# Patient Record
Sex: MALE | Race: WHITE | Employment: OTHER | URBAN - METROPOLITAN AREA
[De-identification: names, ages, dates, MRNs, and addresses within clinical notes are randomized per-mention and may not be internally consistent; named-entity substitution may affect disease eponyms.]

---

## 2017-06-02 ENCOUNTER — OFFICE VISIT (OUTPATIENT)
Dept: INTERNAL MEDICINE CLINIC | Age: 72
End: 2017-06-02

## 2017-06-02 VITALS
TEMPERATURE: 97.6 F | OXYGEN SATURATION: 98 % | SYSTOLIC BLOOD PRESSURE: 124 MMHG | DIASTOLIC BLOOD PRESSURE: 84 MMHG | RESPIRATION RATE: 18 BRPM | HEART RATE: 67 BPM | HEIGHT: 72 IN | WEIGHT: 231 LBS | BODY MASS INDEX: 31.29 KG/M2

## 2017-06-02 DIAGNOSIS — I10 BENIGN ESSENTIAL HTN: Primary | ICD-10-CM

## 2017-06-02 DIAGNOSIS — H61.22 HEARING LOSS DUE TO CERUMEN IMPACTION, LEFT: ICD-10-CM

## 2017-06-02 NOTE — PROGRESS NOTES
HISTORY OF PRESENT ILLNESS    Chief Complaint   Patient presents with    Blood Pressure Check     6 mo f/u       Presents for follow-up    He is doing fairly well. Went to the beach last week    Reports left ear pressure since 4/8 while on a cruise. Scheduled colonoscopy for 7/17/17    Hypertension  Hypertension ROS: taking medications as instructed, no medication side effects noted, no TIA's, no chest pain on exertion, no dyspnea on exertion, no swelling of ankles     reports that he has never smoked. He has never used smokeless tobacco.    reports that he drinks about 2.5 oz of alcohol per week    BP Readings from Last 2 Encounters:   06/02/17 124/84   12/09/16 136/85       Review of Systems   All other systems reviewed and are negative, except as noted in HPI    Past Medical and Surgical History   has a past medical history of Benign essential HTN; Elevated hemidiaphragm; History of rubella; Prostate cancer ([facility] [address] (5/2002); Right hip pain; Rosacea; and Umbilical hernia (7/00/8947). has a past surgical history that includes prostatectomy (5/2002); colonoscopy (02/25/2005); and hernia repair (05/2012). reports that he has never smoked. He has never used smokeless tobacco. He reports that he drinks about 2.5 oz of alcohol per week   family history includes Cancer in his son; Cancer (age of onset: 79) in his father; Cancer (age of onset: 76) in his mother; Heart Disease in his brother; Hypertension in his brother. Physical Exam   Nursing note and vitals reviewed. Blood pressure 124/84, pulse 67, temperature 97.6 °F (36.4 °C), temperature source Oral, resp. rate 18, height 6' (1.829 m), weight 231 lb (104.8 kg), SpO2 98 %. Constitutional:  No distress. Eyes: Conjunctivae are normal.   Ears:  Hearing grossly decreased on left. Cerumen impaction on left  Cardiovascular: Normal rate.   regular rhythm, no murmurs or gallops  No edema  Pulmonary/Chest: Effort normal.   CTAB  Musculoskeletal: moves all 4 extremities   Neurological: Alert and oriented to person, place, and time. Skin: No rash noted. Psychiatric: Normal mood and affect. Behavior is normal.     ASSESSMENT and PLAN  Claven Mcburney was seen today for blood pressure check. Diagnoses and all orders for this visit:    Benign essential HTN- Controlled on current regimen. Continue current medications as written in chart    Hearing loss due to cerumen impaction, left  Ceruminosis is noted. Wax is removed by manual debridement. Instructions for home care to prevent wax buildup are given. lab results and schedule of future lab studies reviewed with patient  reviewed medications and side effects in detail    Return to clinic for further evaluation if new symptoms develop    Follow-up Disposition: Not on File    Current Outpatient Prescriptions   Medication Sig    lisinopril (PRINIVIL, ZESTRIL) 20 mg tablet TAKE ONE TABLET BY MOUTH EVERY DAY    doxycycline (VIBRAMYCIN) 50 mg capsule TAKE ONE CAPSULE BY MOUTH EVERY DAY.  ibuprofen (ADVIL) 200 mg tablet Take  by mouth. 2 tablets BID    ACETAMINOPHEN/DIPHENHYDRAMINE (EXCEDRIN P.M. PO) Take  by mouth. 1 tablet BID    sildenafil citrate (VIAGRA) 100 mg tablet Take 1 Tab by mouth daily as needed.  multivitamins-minerals-lutein (CENTRUM SILVER) Tab Take 1 Tab by mouth daily. No current facility-administered medications for this visit.

## 2017-06-02 NOTE — MR AVS SNAPSHOT
Visit Information Date & Time Provider Department Dept. Phone Encounter #  
 6/2/2017  8:30 AM Jerilyn Mann MD [facility] [address] [facility] [id number] Upcoming Health Maintenance Date Due COLONOSCOPY 12/9/2017* INFLUENZA AGE 9 TO ADULT 8/1/2017 MEDICARE YEARLY EXAM 12/10/2017 GLAUCOMA SCREENING Q2Y 12/16/2018 Pneumococcal 65+ High/Highest Risk (2 of 2 - PPSV23) 1/1/2020 DTaP/Tdap/Td series (2 - Td) 12/9/2026 *Topic was postponed. The date shown is not the original due date. Allergies as of 6/2/2017  Review Complete On: 6/2/2017 By: Jerilyn Mann MD  
 No Known Allergies Current Immunizations  Reviewed on 12/9/2016 Name Date Influenza High Dose Vaccine PF 12/9/2016 Influenza Vaccine 10/1/2015 Influenza Vaccine Split 10/12/2012, 10/7/2011 Pneumococcal Vaccine (Unspecified Type) 1/1/2015 TB Skin Test (PPD) 1/1/2008 Not reviewed this visit You Were Diagnosed With   
  
 Codes Comments Benign essential HTN    -  Primary ICD-10-CM: I10 
ICD-9-CM: 401.1 Hearing loss due to cerumen impaction, left     ICD-10-CM: H61.22 
ICD-9-CM: 389.8, 380.4 Vitals BP Pulse Temp Resp Height(growth percentile) Weight(growth percentile) 124/84 67 97.6 °F (36.4 °C) (Oral) 18 6' (1.829 m) 231 lb (104.8 kg) SpO2 BMI Smoking Status 98% 31.33 kg/m2 Never Smoker Vitals History BMI and BSA Data Body Mass Index Body Surface Area  
 31.33 kg/m 2 2.31 m 2 Preferred Pharmacy Pharmacy Name Phone [phone] Your Updated Medication List  
  
   
This list is accurate as of: 6/2/17  8:51 AM.  Always use your most recent med list. ADVIL 200 mg tablet Generic drug:  ibuprofen Take  by mouth. 2 tablets BID CENTRUM SILVER Tab tablet Generic drug:  multivitamins-minerals-lutein Take 1 Tab by mouth daily. doxycycline 50 mg capsule Commonly known as:  VIBRAMYCIN  
TAKE ONE CAPSULE BY MOUTH EVERY DAY. EXCEDRIN P.M. PO Take  by mouth. 1 tablet BID  
  
 lisinopril 20 mg tablet Commonly known as:  PRINIVIL, ZESTRIL  
TAKE ONE TABLET BY MOUTH EVERY DAY  
  
 sildenafil citrate 100 mg tablet Commonly known as:  VIAGRA Take 1 Tab by mouth daily as needed. Introducing [facility] HE[facility] SERVICES! Dear Kaylee Parson: Thank you for requesting a [employer] account. Our records indicate that you already have an active [employer] App account. You can access your account anytime at [email or web address] Did you know that you can access your hospital and ER discharge instructions at any time in [employer] App? You can also review all of your test results from your hospital stay or ER visit. Additional Information If you have questions, please visit the Frequently Asked Questions section of the [employer] website at [email or web address] Remember, [employer] App is NOT to be used for urgent needs. For medical emergencies, dial 911. Now available from your iPhone and Android! Please provide this summary of care documentation to your next provider. Your primary care clinician is listed as Liz Hunter. If you have any questions after today's visit, please call [phone].

## 2017-12-04 ENCOUNTER — HOSPITAL ENCOUNTER (OUTPATIENT)
Dept: LAB | Age: 72
Discharge: HOME | End: 2017-12-04
Payer: MEDICARE

## 2017-12-04 ENCOUNTER — OFFICE VISIT (OUTPATIENT)
Dept: INTERNAL MEDICINE CLINIC | Age: 72
End: 2017-12-04

## 2017-12-04 VITALS
DIASTOLIC BLOOD PRESSURE: 69 MMHG | BODY MASS INDEX: 31.56 KG/M2 | SYSTOLIC BLOOD PRESSURE: 138 MMHG | HEART RATE: 76 BPM | HEIGHT: 72 IN | RESPIRATION RATE: 12 BRPM | WEIGHT: 233 LBS | TEMPERATURE: 97.7 F

## 2017-12-04 DIAGNOSIS — Z00.00 MEDICARE ANNUAL WELLNESS VISIT, SUBSEQUENT: Primary | ICD-10-CM

## 2017-12-04 DIAGNOSIS — Z12.5 SCREENING PSA (PROSTATE SPECIFIC ANTIGEN): ICD-10-CM

## 2017-12-04 DIAGNOSIS — Z23 ENCOUNTER FOR IMMUNIZATION: ICD-10-CM

## 2017-12-04 DIAGNOSIS — I10 BENIGN ESSENTIAL HTN: ICD-10-CM

## 2017-12-04 DIAGNOSIS — L71.9 ROSACEA: ICD-10-CM

## 2017-12-04 DIAGNOSIS — C61 PROSTATE CANCER (HCC): ICD-10-CM

## 2017-12-04 PROCEDURE — 80061 LIPID PANEL: CPT

## 2017-12-04 PROCEDURE — 84153 ASSAY OF PSA TOTAL: CPT

## 2017-12-04 PROCEDURE — 80048 BASIC METABOLIC PNL TOTAL CA: CPT

## 2017-12-04 PROCEDURE — 36415 COLL VENOUS BLD VENIPUNCTURE: CPT

## 2017-12-04 RX ORDER — LISINOPRIL 20 MG/1
TABLET ORAL
Qty: 90 TAB | Refills: 4 | Status: SHIPPED | OUTPATIENT
Start: 2017-12-04 | End: 2019-02-16 | Stop reason: SDUPTHER

## 2017-12-04 RX ORDER — DOXYCYCLINE HYCLATE 50 MG/1
CAPSULE ORAL
Qty: 90 CAP | Refills: 4 | Status: SHIPPED | OUTPATIENT
Start: 2017-12-04 | End: 2018-06-05 | Stop reason: SDUPTHER

## 2017-12-04 NOTE — PROGRESS NOTES
HISTORY OF PRESENT ILLNESS    Chief Complaint   Patient presents with    Hypertension       Presents for follow-up    Getting re- 1/20/18  Having R THR 2/12/18 w Dr. Carol Thomas  Colonoscopy 7/19/17 tubular adenoma- repeat 5 years  Using doxycyline for rosacea    Hypertension  Hypertension ROS: taking medications as instructed, no medication side effects noted, no TIA's, no chest pain on exertion, no dyspnea on exertion, no swelling of ankles     reports that he has never smoked. He has never used smokeless tobacco.    reports that he drinks about 2.5 oz of alcohol per week    BP Readings from Last 2 Encounters:   12/04/17 138/69   06/02/17 124/84       Review of Systems   All other systems reviewed and are negative, except as noted in HPI    Past Medical and Surgical History   has a past medical history of Benign essential HTN; Elevated hemidiaphragm; History of rubella; Prostate cancer ([facility] [address] (5/2002); Right hip pain; Rosacea; and Umbilical hernia (8/47/3176). has a past surgical history that includes prostatectomy (5/2002); colonoscopy (02/25/2005); hernia repair (05/2012); and colonoscopy (7/17/17). reports that he has never smoked. He has never used smokeless tobacco. He reports that he drinks about 2.5 oz of alcohol per week   family history includes Cancer in his son; Cancer (age of onset: 79) in his father; Cancer (age of onset: 76) in his mother; Heart Disease in his brother; Hypertension in his brother. Physical Exam   Nursing note and vitals reviewed. Blood pressure 138/69, pulse 76, temperature 97.7 °F (36.5 °C), temperature source Oral, resp. rate 12, height 6' (1.829 m), weight 233 lb (105.7 kg). Constitutional:  No distress. Eyes: Conjunctivae are normal.   Ears:  Hearing grossly intact  Cardiovascular: Normal rate.   regular rhythm, no murmurs or gallops  No edema  Pulmonary/Chest: Effort normal.   CTAB  Musculoskeletal: moves all 4 extremities   Neurological: Alert and oriented to person, place, and time. Skin: No rash noted. Psychiatric: Normal mood and affect. Behavior is normal.     ASSESSMENT and PLAN  Diagnoses and all orders for this visit:    1. Medicare annual wellness visit, subsequent    2. Benign essential HTN- Controlled on current regimen. Continue current medications as written in chart  -     lisinopril (PRINIVIL, ZESTRIL) 20 mg tablet; TAKE ONE TABLET BY MOUTH EVERY DAY  -     LIPID PANEL  -     METABOLIC PANEL, BASIC    3. Rosacea  -     doxycycline (VIBRAMYCIN) 50 mg capsule; TAKE ONE CAPSULE BY MOUTH EVERY DAY. 4. Encounter for immunization  -     Pneumococcal Polysaccharide vaccine, 23-Valent, Adult or Immunocompromised  -     ADMIN PNEUMOCOCCAL VACCINE  Medicare Injection Admin Charge    5. Prostate cancer ([facility] [address]    6. Screening PSA (prostate specific antigen)  -     PSA W/ REFLX FREE PSA    lab results and schedule of future lab studies reviewed with patient  reviewed medications and side effects in detail  Return to clinic for further evaluation if new symptoms develop      Current Outpatient Prescriptions   Medication Sig    doxycycline (VIBRAMYCIN) 50 mg capsule TAKE ONE CAPSULE BY MOUTH EVERY DAY.  lisinopril (PRINIVIL, ZESTRIL) 20 mg tablet TAKE ONE TABLET BY MOUTH EVERY DAY    ibuprofen (ADVIL) 200 mg tablet Take  by mouth. 2 tablets BID    ACETAMINOPHEN/DIPHENHYDRAMINE (EXCEDRIN P.M. PO) Take  by mouth. 1 tablet BID    sildenafil citrate (VIAGRA) 100 mg tablet Take 1 Tab by mouth daily as needed.  multivitamins-minerals-lutein (CENTRUM SILVER) Tab Take 1 Tab by mouth daily. No current facility-administered medications for this visit.

## 2017-12-04 NOTE — PROGRESS NOTES
This is a Subsequent Medicare Annual Wellness Visit providing Personalized Prevention Plan Services (PPPS) (Performed 12 months after initial AWV and PPPS )    I have reviewed the patient's medical history in detail and updated the computerized patient record. History     Past Medical History:   Diagnosis Date    Benign essential HTN     Elevated hemidiaphragm     chronic, 1985. saw Dr. Jennifer Quispe [facility]    History of rubella     Prostate cancer [facility]) 5/2002    radical prostatectomy at [facility] Right hip pain     Dr. Yeimy Vilalreal, Dr. Lucita Bragg. severe OA. MRI. injection helped    Rosacea     Umbilical hernia 7/41/9851       Past Surgical History:   Procedure Laterality Date    HX COLONOSCOPY  02/25/2005    Dr. Laila López HX COLONOSCOPY  07/19/2017    Dr. Patria Baker.  tubular adenoma - repeat 5 years    HX HERNIA REPAIR  58/9879    umbilical, Dr. Vincenza Litten    HX PROST[place]CTOMY  5/2002    [place]       Current Outpatient Prescriptions   Medication Sig    doxycycline (VIBRAMYCIN) 50 mg capsule TAKE ONE CAPSULE BY MOUTH EVERY DAY.  lisinopril (PRINIVIL, ZESTRIL) 20 mg tablet TAKE ONE TABLET BY MOUTH EVERY DAY    ibuprofen (ADVIL) 200 mg tablet Take  by mouth. 2 tablets BID    ACETAMINOPHEN/DIPHENHYDRAMINE (EXCEDRIN P.M. PO) Take  by mouth. 1 tablet BID    sildenafil citrate (VIAGRA) 100 mg tablet Take 1 Tab by mouth daily as needed.  multivitamins-minerals-lutein (CENTRUM SILVER) Tab Take 1 Tab by mouth daily. No current facility-administered medications for this visit. No Known Allergies    Family History   Problem Relation Age of Onset    Cancer Mother 76     breast.  d.at 80    Cancer Father 79     pulmonary carcinomatous    Heart Disease Brother      MI w stent    Hypertension Brother     Cancer Son      Hodgkins dz        reports that he has never smoked.  He has never used smokeless tobacco.   reports that he drinks about 2.5 oz of alcohol per week       Depression Risk Factor Screening: Alcohol Risk Factor Screening: On any occasion during the past 3 months, have you had more than 3 drinks containing alcohol? No    Do you average more than 14 drinks per week? No      Functional Ability and Level of Safety:     Hearing Loss   mild    Activities of Daily Living   Self-care. Requires assistance with: no ADLs    Fall Risk     Fall Risk Assessment, last 12 mths 6/2/2017   Able to walk? Yes   Fall in past 12 months? No         Abuse Screen   Patient is not abused    Review of Systems   A comprehensive review of systems was negative except for that written in the HPI. Physical Examination     Evaluation of Cognitive Function:  Mood/affect:  neutral, happy  Appearance: age appropriate  Family member/caregiver input: none    Blood pressure 138/69, pulse 76, temperature 97.7 °F (36.5 °C), temperature source Oral, resp. rate 12, height 6' (1.829 m), weight 233 lb (105.7 kg). General appearance: alert, cooperative, no distress, appears stated age  Neck: supple, symmetrical, trachea midline, no adenopathy, thyroid: not enlarged, symmetric, no tenderness/mass/nodules, no carotid bruit and no JVD  Lungs: clear to auscultation bilaterally  Heart: regular rate and rhythm, S1, S2 normal, no murmur, click, rub or gallop  Extremities: extremities normal, atraumatic, no cyanosis or edema    Patient Care Team:  Linette Maldonado MD as PCP - General (Internal Medicine)      Advice/Referrals/Counseling   Education and counseling provided. See below for specific orders    Assessment/Plan     Potential medication side effects were discussed with the patient; let me know if any occur.   Return for yearly Annual Wellness Visits      Orders Placed This Encounter    Pneumococcal Polysaccharide vaccine, 23-Valent, Adult or Immunocompromised    LIPID PANEL    PSA W/ REFLX FREE PSA    METABOLIC PANEL, BASIC    ADMIN PNEUMOCOCCAL VACCINE  Medicare Injection Admin Charge    doxycycline (VIBRAMYCIN) 50 mg capsule    lisinopril (PRINIVIL, ZESTRIL) 20 mg tablet

## 2017-12-04 NOTE — MR AVS SNAPSHOT
Visit Information Date & Time Provider Department Dept. Phone Encounter #  
 12/4/2017  8:30 AM Evelyn Barrientos MD [facility] [id number] Upcoming Health Maintenance Date Due COLONOSCOPY 12/9/2017* MEDICARE YEARLY EXAM 12/10/2017 GLAUCOMA SCREENING Q2Y 12/16/2018 Pneumococcal 65+ High/Highest Risk (2 of 2 - PPSV23) 1/1/2020 DTaP/Tdap/Td series (2 - Td) 12/9/2026 *Topic was postponed. The date shown is not the original due date. Allergies as of 12/4/2017  Review Complete On: 12/4/2017 By: Evelyn Barrientos MD  
 No Known Allergies Current Immunizations  Reviewed on 12/4/2017 Name Date Influenza High Dose Vaccine PF 12/9/2016 Influenza Vaccine 10/10/2017, 10/1/2015 Influenza Vaccine Split 10/12/2012, 10/7/2011 Pneumococcal Polysaccharide (PPSV-23)  Incomplete Pneumococcal Vaccine (Unspecified Type) 12/1/2015 TB Skin Test (PPD) 1/1/2008 Reviewed by Adrienne Mccloud on 12/4/2017 at  8:40 AM  
 Reviewed by Evelyn Barrientos MD on 12/4/2017 at  8:55 AM  
 Reviewed by Evelyn Barrientos MD on 12/4/2017 at  8:55 AM  
 Reviewed by Evelyn Barrientos MD on 12/4/2017 at  8:58 AM  
You Were Diagnosed With   
  
 Codes Comments Benign essential HTN    -  Primary ICD-10-CM: I10 
ICD-9-CM: 401.1 Rosacea     ICD-10-CM: L71.9 ICD-9-CM: 695.3 Encounter for immunization     ICD-10-CM: O75 ICD-9-CM: V03.89 Prostate cancer [facility])     ICD-10-CM: G90 ICD-9-CM: 965 Screening PSA (prostate specific antigen)     ICD-10-CM: Z12.5 ICD-9-CM: V76.44 Vitals BP Pulse Temp Resp Height(growth percentile) Weight(growth percentile)  
 138/69 (BP 1 Location: Left arm, BP Patient Position: Sitting) 76 97.7 °F (36.5 °C) (Oral) 12 6' (1.829 m) 233 lb (105.7 kg) BMI Smoking Status 31.6 kg/m2 Never Smoker Vitals History BMI and BSA Data  Body Mass Index Body Surface Area  
 31.6 kg/m 2 2.32 m 2  
  
  
 Preferred Pharmacy Pharmacy Name Phone [facility] [place] [address] [phone] Your Updated Medication List  
  
   
This list is accurate as of: 12/4/17  9:07 AM.  Always use your most recent med list. ADVIL 200 mg tablet Generic drug:  ibuprofen Take  by mouth. 2 tablets BID CENTRUM SILVER Tab tablet Generic drug:  multivitamins-minerals-lutein Take 1 Tab by mouth daily. doxycycline 50 mg capsule Commonly known as:  VIBRAMYCIN  
TAKE ONE CAPSULE BY MOUTH EVERY DAY. EXCEDRIN P.M. PO Take  by mouth. 1 tablet BID  
  
 lisinopril 20 mg tablet Commonly known as:  PRINIVIL, ZESTRIL  
TAKE ONE TABLET BY MOUTH EVERY DAY  
  
 sildenafil citrate 100 mg tablet Commonly known as:  VIAGRA Take 1 Tab by mouth daily as needed. Prescriptions Sent to Pharmacy Refills  
 doxycycline (VIBRAMYCIN) 50 mg capsule 4 Sig: TAKE ONE CAPSULE BY MOUTH EVERY DAY. Class: Normal  
 Pharmacy: [address] [place] [address] 18, [address] [place] [address] Ph #: [phone]  
 lisinopril (PRINIVIL, ZESTRIL) 20 mg tablet 4 Sig: TAKE ONE TABLET BY MOUTH EVERY DAY Class: Normal  
 Pharmacy: [address] [place] [address] 18, [address] [place] [address] Ph #: [phone] We Performed the Following ADMIN PNEUMOCOCCAL VACCINE [ [facility]] LIPID PANEL [55671 CPT(R)] METABOLIC PANEL, BASIC [55162 CPT(R)] PNEUMOCOCCAL POLYSACCHARIDE VACCINE, 23-VALENT, ADULT OR IMMUNOSUPPRESSED PT DOSE, [02545 CPT(R)] PSA W/ REFLX FREE PSA [52474 CPT(R)] [facility] [place] [facility]! Dear Quan Staley: Thank you for requesting a Foodzai account. Our records indicate that you already have an active Foodzai account. You can access your account anytime at [email or web address] [employer] Systems/"Shahab [employer] Tabatabai[employer] Broker" Did you know that you can access your hospital and ER discharge instructions at any time in Foodzai?   You can also review all of your test results from your hospital stay or ER visit. Additional Information If you have questions, please visit the Frequently Asked Questions section of the [employer] website at [email or web address] Remember, Prestiam[employer] is NOT to be used for urgent needs. For medical emergencies, dial 911. Now available from your iPhone and Android! Please provide this summary of care documentation to your next provider. Your primary care clinician is listed as Elif Lara. If you have any questions after today's visit, please call [phone].

## 2017-12-05 LAB
BUN SERPL-MCNC: 28 MG/DL (ref 8–27)
BUN/CREAT SERPL: 35 (ref 10–24)
CALCIUM SERPL-MCNC: 9.5 MG/DL (ref 8.6–10.2)
CHLORIDE SERPL-SCNC: 99 MMOL/L (ref 96–106)
CHOLEST SERPL-MCNC: 196 MG/DL (ref 100–199)
CO2 SERPL-SCNC: 23 MMOL/L (ref 18–29)
CREAT SERPL-MCNC: 0.81 MG/DL (ref 0.76–1.27)
GFR SERPLBLD CREATININE-BSD FMLA CKD-EPI: 103 ML/MIN/1.73
GFR SERPLBLD CREATININE-BSD FMLA CKD-EPI: 89 ML/MIN/1.73
GLUCOSE SERPL-MCNC: 111 MG/DL (ref 65–99)
HDLC SERPL-MCNC: 58 MG/DL
INTERPRETATION, 910389: NORMAL
LDLC SERPL CALC-MCNC: 127 MG/DL (ref 0–99)
POTASSIUM SERPL-SCNC: 5.3 MMOL/L (ref 3.5–5.2)
PSA SERPL-MCNC: <0.1 NG/ML (ref 0–4)
REFLEX CRITERIA: NORMAL
SODIUM SERPL-SCNC: 138 MMOL/L (ref 134–144)
TRIGL SERPL-MCNC: 56 MG/DL (ref 0–149)
VLDLC SERPL CALC-MCNC: 11 MG/DL (ref 5–40)

## 2018-04-26 ENCOUNTER — OFFICE VISIT (OUTPATIENT)
Dept: INTERNAL MEDICINE CLINIC | Age: 73
End: 2018-04-26

## 2018-04-26 VITALS
RESPIRATION RATE: 16 BRPM | DIASTOLIC BLOOD PRESSURE: 80 MMHG | HEART RATE: 67 BPM | OXYGEN SATURATION: 96 % | TEMPERATURE: 98.1 F | BODY MASS INDEX: 31.29 KG/M2 | HEIGHT: 72 IN | SYSTOLIC BLOOD PRESSURE: 117 MMHG | WEIGHT: 231 LBS

## 2018-04-26 DIAGNOSIS — R05.9 COUGH: Primary | ICD-10-CM

## 2018-04-26 DIAGNOSIS — I10 BENIGN ESSENTIAL HTN: ICD-10-CM

## 2018-04-26 DIAGNOSIS — J30.1 SEASONAL ALLERGIC RHINITIS DUE TO POLLEN: ICD-10-CM

## 2018-04-26 RX ORDER — FLUTICASONE PROPIONATE 50 MCG
2 SPRAY, SUSPENSION (ML) NASAL DAILY
Qty: 1 BOTTLE | Refills: 3
Start: 2018-04-26 | End: 2018-11-19 | Stop reason: ALTCHOICE

## 2018-04-26 NOTE — PROGRESS NOTES
HISTORY OF PRESENT ILLNESS    Presents with cough for 1 month(s). Dry. No SOB, fever, or chills  Associated symptoms include: mild post-nasal drip. Treatments tried include: medication not used  Last CXR 6/2016 normal L hemidiaphragm elevated (chronic)  Has R hip replacement 1/2018 - xray done? Taking lisinopril for years  Taking zyrtec with no change. Taking nyquil    Hypertension  Hypertension ROS: taking medications as instructed, no TIA's, no chest pain on exertion, no dyspnea on exertion, no swelling of ankles     reports that he has never smoked. He has never used smokeless tobacco.    reports that he drinks about 2.5 oz of alcohol per week    BP Readings from Last 2 Encounters:   04/26/18 117/80   12/04/17 138/69         Review of Systems   All other systems reviewed and are negative, except as noted in HPI    Past Medical and Surgical History   has a past medical history of Benign essential HTN; Elevated hemidiaphragm; History of rubella; Prostate cancer ([facility] [address] (5/2002); Right hip pain; Rosacea; and Umbilical hernia (8/51/2824). has a past surgical history that includes hx prostatectomy (5/2002); hx colonoscopy (02/25/2005); hx colonoscopy (07/19/2017); hx hernia repair (05/2012); and hx hip replacement (Right, 02/12/2018). reports that he has never smoked. He has never used smokeless tobacco. He reports that he drinks about 2.5 oz of alcohol per week   family history includes Cancer in his son; Cancer (age of onset: 79) in his father; Cancer (age of onset: 76) in his mother; Heart Disease in his brother; Hypertension in his brother. Physical Exam   Nursing note and vitals reviewed. Blood pressure 117/80, pulse 67, temperature 98.1 °F (36.7 °C), temperature source Oral, resp. rate 16, height 6' (1.829 m), weight 231 lb (104.8 kg), SpO2 96 %. Constitutional: In no distress. Eyes: Conjunctivae are normal.  HEENT:  No LAD or thyromegaly   Cardiovascular: Normal rate. regular rhythm.   No murmurs  No edema  Pulmonary/Chest: Effort normal. clear to ausculation blaterally  Musculoskeletal:  no edema. Abd:  Neurological: Alert and oriented. Grossly intact cranial nerves and motor function. Skin: No rash noted. Psychiatric: Normal mood and affect. Behavior is normal.     ASSESSMENT and PLAN  Diagnoses and all orders for this visit:    1. Cough- suspect this is allergic. Trial of flonase. If fails to improve will consider CXR and consider changing lisinopril    2. Seasonal allergic rhinitis due to pollen    3. Benign essential HTN    Other orders  -     fluticasone (FLONASE) 50 mcg/actuation nasal spray; 2 Sprays by Both Nostrils route daily. lab results and schedule of future lab studies reviewed with patient  reviewed medications and side effects in detail    Return to clinic for further evaluation if new symptoms develop or if current symptoms worsen or fail to resolve. There are no Patient Instructions on file for this visit.

## 2018-04-26 NOTE — PROGRESS NOTES
Tanika Trujillo is a 68 y.o. male    Chief Complaint   Patient presents with    Cough     persistant X 1 month        1. Have you been to the ER, urgent care clinic since your last visit? Hospitalized since your last visit? no    2. Have you seen or consulted any other health care providers outside of the [address] [place] [address] since your last visit? Include any pap smears or colon screening.   no    Visit Vitals    /80 (BP 1 Location: Left arm, BP Patient Position: Sitting)    Pulse 67    Temp 98.1 °F (36.7 °C) (Oral)    Resp 16    Ht 6' (1.829 m)    Wt 231 lb (104.8 kg)    SpO2 96%    BMI 31.33 kg/m2

## 2018-06-05 ENCOUNTER — OFFICE VISIT (OUTPATIENT)
Dept: INTERNAL MEDICINE CLINIC | Age: 73
End: 2018-06-05

## 2018-06-05 VITALS
WEIGHT: 229 LBS | DIASTOLIC BLOOD PRESSURE: 83 MMHG | OXYGEN SATURATION: 97 % | HEART RATE: 72 BPM | TEMPERATURE: 98.2 F | SYSTOLIC BLOOD PRESSURE: 122 MMHG | BODY MASS INDEX: 31.02 KG/M2 | HEIGHT: 72 IN | RESPIRATION RATE: 18 BRPM

## 2018-06-05 DIAGNOSIS — L71.9 ROSACEA: ICD-10-CM

## 2018-06-05 DIAGNOSIS — I10 BENIGN ESSENTIAL HTN: Primary | ICD-10-CM

## 2018-06-05 RX ORDER — DOXYCYCLINE HYCLATE 50 MG/1
CAPSULE ORAL
Qty: 90 CAP | Refills: 4 | Status: SHIPPED | OUTPATIENT
Start: 2018-06-05 | End: 2019-06-06 | Stop reason: ALTCHOICE

## 2018-06-05 RX ORDER — GUAIFENESIN 100 MG/5ML
81 LIQUID (ML) ORAL DAILY
COMMUNITY
End: 2019-06-06 | Stop reason: ALTCHOICE

## 2018-06-05 RX ORDER — ZOSTER VACCINE RECOMBINANT, ADJUVANTED 50 MCG/0.5
0.5 KIT INTRAMUSCULAR ONCE
Qty: 0.5 ML | Refills: 1 | Status: SHIPPED | OUTPATIENT
Start: 2018-06-05 | End: 2018-06-05

## 2018-06-05 NOTE — PROGRESS NOTES
HISTORY OF PRESENT ILLNESS    Chief Complaint   Patient presents with    Hypertension       Presents for follow-up    Doing well. Hypertension  Still has mild cough. Not bothering him. Hypertension ROS: taking medications as instructed, no medication side effects noted, no TIA's, no chest pain on exertion, no dyspnea on exertion, no swelling of ankles     reports that he has never smoked. He has never used smokeless tobacco.    reports that he drinks about 2.5 oz of alcohol per week    BP Readings from Last 2 Encounters:   06/05/18 122/83   04/26/18 117/80       Review of Systems   All other systems reviewed and are negative, except as noted in HPI    Past Medical and Surgical History   has a past medical history of Benign essential HTN; Elevated hemidiaphragm; History of rubella; Prostate cancer ([facility] [address] (5/2002); Right hip pain; Rosacea; and Umbilical hernia (9/50/3283). has a past surgical history that includes hx prostatectomy (5/2002); hx colonoscopy (02/25/2005); hx colonoscopy (07/19/2017); hx hernia repair (05/2012); and hx hip replacement (Right, 02/12/2018). reports that he has never smoked. He has never used smokeless tobacco. He reports that he drinks about 2.5 oz of alcohol per week   family history includes Cancer in his son; Cancer (age of onset: 79) in his father; Cancer (age of onset: 76) in his mother; Heart Disease in his brother; Hypertension in his brother. Physical Exam   Nursing note and vitals reviewed. Blood pressure 122/83, pulse 72, temperature 98.2 °F (36.8 °C), temperature source Oral, resp. rate 18, height 6' (1.829 m), weight 229 lb (103.9 kg), SpO2 97 %. Constitutional:  No distress. Eyes: Conjunctivae are normal.   Ears:  Hearing grossly intact  Cardiovascular: Normal rate. regular rhythm, no murmurs or gallops  No edema  Pulmonary/Chest: Effort normal.   CTAB  Musculoskeletal: moves all 4 extremities   Neurological: Alert and oriented to person, place, and time. Skin: No rash noted. Psychiatric: Normal mood and affect. Behavior is normal.     ASSESSMENT and PLAN  Diagnoses and all orders for this visit:    1. Benign essential HTN  Controlled on current regimen. Continue current medications as written in chart. 2. Rosacea  -     doxycycline (VIBRAMYCIN) 50 mg capsule; TAKE ONE CAPSULE BY MOUTH EVERY DAY. Other orders  -     SHINGRIX, PF, 50 mcg/0.5 mL susr injection; 0.5 mL by IntraMUSCular route once for 1 dose. Receive 2nd dose in 2-6 months. For Shingles (Zoster) prevention        lab results and schedule of future lab studies reviewed with patient  reviewed medications and side effects in detail  Return to clinic for further evaluation if new symptoms develop    Current Outpatient Prescriptions   Medication Sig    aspirin 81 mg chewable tablet Take 81 mg by mouth daily.  doxycycline (VIBRAMYCIN) 50 mg capsule TAKE ONE CAPSULE BY MOUTH EVERY DAY.  fluticasone (FLONASE) 50 mcg/actuation nasal spray 2 Sprays by Both Nostrils route daily.  lisinopril (PRINIVIL, ZESTRIL) 20 mg tablet TAKE ONE TABLET BY MOUTH EVERY DAY     No current facility-administered medications for this visit.

## 2018-06-05 NOTE — MR AVS SNAPSHOT
[facility] 
 
 
 [address] [place] [address] [facility] 
[phone] Patient: Rahel Quinn MRN: [id number] WND:[birth year] Visit Information Date & Time Provider Department Dept. Phone Encounter #  
 6/5/2018  9:30 AM Merlene Meng MD [facility] [id number] Upcoming Health Maintenance Date Due Influenza Age 5 to Adult 8/1/2018 MEDICARE YEARLY EXAM 12/5/2018 GLAUCOMA SCREENING Q2Y 12/16/2018 DTaP/Tdap/Td series (2 - Td) 12/9/2026 COLONOSCOPY 7/19/2027 Allergies as of 6/5/2018  Review Complete On: 6/5/2018 By: Merlene Meng MD  
 No Known Allergies Current Immunizations  Reviewed on 6/5/2018 Name Date Influenza High Dose Vaccine PF 12/9/2016 Influenza Vaccine 10/10/2017, 10/1/2015 Influenza Vaccine Split 10/12/2012, 10/7/2011 Pneumococcal Polysaccharide (PPSV-23) 12/4/2017 Pneumococcal Vaccine (Unspecified Type) 12/1/2015 TB Skin Test (PPD) 1/1/2008 Reviewed by Merlene Meng MD on 6/5/2018 at 10:06 AM  
You Were Diagnosed With   
  
 Codes Comments Benign essential HTN    -  Primary ICD-10-CM: I10 
ICD-9-CM: 401.1 Vitals BP Pulse Temp Resp Height(growth percentile) Weight(growth percentile) 122/83 (BP 1 Location: Left arm, BP Patient Position: Sitting) 72 98.2 °F (36.8 °C) (Oral) 18 6' (1.829 m) 229 lb (103.9 kg) SpO2 BMI Smoking Status 97% 31.06 kg/m2 Never Smoker Vitals History BMI and BSA Data Body Mass Index Body Surface Area 31.06 kg/m 2 2.3 m 2 [facility] [phone] Your Updated Medication List  
  
   
This list is accurate as of 6/5/18 10:09 AM.  Always use your most recent med list.  
  
  
  
  
 aspirin 81 mg chewable tablet Take 81 mg by mouth daily. doxycycline 50 mg capsule Commonly known as:  VIBRAMYCIN  
TAKE ONE CAPSULE BY MOUTH EVERY DAY. fluticasone 50 mcg/actuation nasal spray Commonly known as:  Roderick Baljinder 2 Sprays by Both Nostrils route daily. lisinopril 20 mg tablet Commonly known as:  PRINIVIL, ZESTRIL  
TAKE ONE TABLET BY MOUTH EVERY DAY  
  
 SHINGRIX (PF) 50 mcg/0.5 mL Susr injection Generic drug:  varicella-zoster recombinant (PF)  
0.5 mL by IntraMUSCular route once for 1 dose. Receive 2nd dose in 2-6 months. For Shingles (Zoster) prevention Prescriptions Printed Refills SHINGRIX, PF, 50 mcg/0.5 mL susr injection 1 Si.5 mL by IntraMUSCular route once for 1 dose. Receive 2nd dose in 2-6 months. For Shingles (Zoster) prevention Class: Print Route: IntraMUSCular Introducing [facility] SERVICES! Dear Muna Davis: Thank you for requesting a Leevia account. Our records indicate that you already have an active Leevia account. You can access your account anytime at [email or web address] ([email or web address]). [email or web address]/[email or web address] (formerly OpenERP) Did you know that you can access your hospital and ER discharge instructions at any time in Leevia? You can also review all of your test results from your hospital stay or ER visit. Additional Information If you have questions, please visit the Frequently Asked Questions section of the Leevia website at [email or web address] ([email or web address]). Dealflicks/Odoo (formerly OpenERP)/. Remember, Leevia is NOT to be used for urgent needs. For medical emergencies, dial 911. Now available from your iPhone and Android! Please provide this summary of care documentation to your next provider. Your primary care clinician is listed as Sofia Ding. If you have any questions after today's visit, please call [phone].

## 2018-06-29 ENCOUNTER — HOSPITAL ENCOUNTER (OUTPATIENT)
Dept: LAB | Age: 73
Discharge: HOME | End: 2018-06-29

## 2018-09-20 RX ORDER — TADALAFIL 5 MG/1
5 TABLET, FILM COATED ORAL DAILY
Qty: 30 TAB | Refills: 0 | Status: SHIPPED | OUTPATIENT
Start: 2018-09-20 | End: 2019-06-06 | Stop reason: ALTCHOICE

## 2018-09-20 RX ORDER — CYCLOBENZAPRINE HCL 10 MG
10 TABLET ORAL
Qty: 30 TAB | Refills: 0 | Status: SHIPPED | OUTPATIENT
Start: 2018-09-20 | End: 2018-11-19 | Stop reason: ALTCHOICE

## 2018-11-19 ENCOUNTER — OFFICE VISIT (OUTPATIENT)
Dept: INTERNAL MEDICINE CLINIC | Age: 73
End: 2018-11-19

## 2018-11-19 VITALS
TEMPERATURE: 97.7 F | HEIGHT: 72 IN | SYSTOLIC BLOOD PRESSURE: 135 MMHG | WEIGHT: 236.4 LBS | RESPIRATION RATE: 16 BRPM | OXYGEN SATURATION: 96 % | BODY MASS INDEX: 32.02 KG/M2 | DIASTOLIC BLOOD PRESSURE: 86 MMHG | HEART RATE: 71 BPM

## 2018-11-19 DIAGNOSIS — Z85.46 HISTORY OF PROSTATE CANCER: ICD-10-CM

## 2018-11-19 DIAGNOSIS — I10 BENIGN ESSENTIAL HTN: ICD-10-CM

## 2018-11-19 DIAGNOSIS — H25.9 AGE-RELATED CATARACT OF BOTH EYES, UNSPECIFIED AGE-RELATED CATARACT TYPE: Primary | ICD-10-CM

## 2018-11-19 PROBLEM — H26.9 CATARACT: Status: ACTIVE | Noted: 2018-01-01

## 2018-11-19 NOTE — PROGRESS NOTES
HISTORY OF PRESENT ILLNESS Jose Hollis is a 68 y.o. male. HPI Jose Hollis was referred for evaluation by:Dr. Daquan Woodruff for Pre- Op Evaluation. Please see encounter details and orders for consultative summary. Type of surgery : Cataract Surgery site : eyes Anesthesia type: MAC/topical 
Date of procedure:  11/29, 12/13/18 Allergies: No Known Allergies Latex allergy: no 
Prior reactions to anesthesia:  None Functional status:  he is able to ambulate up no  flights of step with no shortness of breath, chest pain Prior cardiac evaluation:   none Current Outpatient Medications Medication Sig  
 folic acid/multivit-min/lutein (CENTRUM SILVER PO) Take 1 Tab by mouth.  CIALIS 5 mg tablet Take 1 Tab by mouth daily. (Patient taking differently: Take 5 mg by mouth as needed.)  aspirin 81 mg chewable tablet Take 81 mg by mouth daily.  doxycycline (VIBRAMYCIN) 50 mg capsule TAKE ONE CAPSULE BY MOUTH EVERY DAY.  lisinopril (PRINIVIL, ZESTRIL) 20 mg tablet TAKE ONE TABLET BY MOUTH EVERY DAY No current facility-administered medications for this visit. Past Medical History:  
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   
 Cataract 2018  Elevated hemidiaphragm   
 chronic, 1985. saw Dr. Blanca Fierro at [facility]  
 History of prostate cancer 05/2002  
 radical prostatectomy at [facility]  History of rubella  Right hip pain Dr. Thony Redmond, Dr. Rios Pulido. severe OA. MRI. injection helped  Rosacea  Umbilical hernia 1/91/1975 Past Surgical History:  
Procedure Laterality Date  HX COLONOSCOPY  02/25/2005 Dr. Bobbi Fonseca  HX COLONOSCOPY  07/19/2017 Dr. Bbobi Fonseca.  tubular adenoma - repeat 5 years  HX HERNIA REPAIR  22/1740  
 umbilical, Dr. Stanton Cord  HX HIP REPLACEMENT Right 02/12/2018 Dr. Rios Pulido  HX OTHER SURGICAL    
 right ear lobe removal of cancer cells  HX PROSTATECTOMY  5/2002 [facility] Social History Tobacco Use  Smoking status: Never Smoker  Smokeless tobacco: Never Used Substance Use Topics  Alcohol use: Yes Alcohol/week: 2.5 oz Types: 5 Glasses of wine per week Comment: 4-5 drinks per week  Drug use: No  
 
 
Blood pressure 135/86, pulse 71, temperature 97.7 °F (36.5 °C), temperature source Oral, resp. rate 16, height 6' (1.829 m), weight 236 lb 6.4 oz (107.2 kg), SpO2 96 %. Review of Systems All other systems reviewed and are negative. Physical Exam  
Constitutional: He is oriented to person, place, and time. He appears well-developed and well-nourished. No distress. Cardiovascular: Normal rate. Pulmonary/Chest: Effort normal.  
Musculoskeletal: He exhibits no edema. Neurological: He is alert and oriented to person, place, and time. Psychiatric: He has a normal mood and affect. His behavior is normal. Judgment and thought content normal.  
Nursing note and vitals reviewed. ASSESSMENT and PLAN Diagnoses and all orders for this visit: 1. Age-related cataract of both eyes, unspecified age-related cataract type Patient is in stable condition and of average, acceptable risk for the proposed surgery. Thank you for the consultation. Fax to Dr. Damien Fraser 2. Benign essential HTN Controlled on current regimen. Continue current medications as written in chart. 
-     LIPID PANEL 
-     CBC W/O DIFF 
-     METABOLIC PANEL, COMPREHENSIVE 3. History of prostate cancer Currently asymptomatic -     PSA W/ REFLX FREE PSA 
 
rtc for wellness exam next month

## 2018-11-19 NOTE — PATIENT INSTRUCTIONS
Body Mass Index: Care Instructions Your Care Instructions Body mass index (BMI) can help you see if your weight is raising your risk for health problems. It uses a formula to compare how much you weigh with how tall you are. · A BMI lower than 18.5 is considered underweight. · A BMI between 18.5 and 24.9 is considered healthy. · A BMI between 25 and 29.9 is considered overweight. A BMI of 30 or higher is considered obese. If your BMI is in the normal range, it means that you have a lower risk for weight-related health problems. If your BMI is in the overweight or obese range, you may be at increased risk for weight-related health problems, such as high blood pressure, heart disease, stroke, arthritis or joint pain, and diabetes. If your BMI is in the underweight range, you may be at increased risk for health problems such as fatigue, lower protection (immunity) against illness, muscle loss, bone loss, hair loss, and hormone problems. BMI is just one measure of your risk for weight-related health problems. You may be at higher risk for health problems if you are not active, you eat an unhealthy diet, or you drink too much alcohol or use tobacco products. Follow-up care is a key part of your treatment and safety. Be sure to make and go to all appointments, and call your doctor if you are having problems. It's also a good idea to know your test results and keep a list of the medicines you take. How can you care for yourself at home? · Practice healthy eating habits. This includes eating plenty of fruits, vegetables, whole grains, lean protein, and low-fat dairy. · If your doctor recommends it, get more exercise. Walking is a good choice. Bit by bit, increase the amount you walk every day. Try for at least 30 minutes on most days of the week. · Do not smoke. Smoking can increase your risk for health problems.  If you need help quitting, talk to your doctor about stop-smoking programs and medicines. These can increase your chances of quitting for good. · Limit alcohol to 2 drinks a day for men and 1 drink a day for women. Too much alcohol can cause health problems. If you have a BMI higher than 25 · Your doctor may do other tests to check your risk for weight-related health problems. This may include measuring the distance around your waist. A waist measurement of more than 40 inches in men or 35 inches in women can increase the risk of weight-related health problems. · Talk with your doctor about steps you can take to stay healthy or improve your health. You may need to make lifestyle changes to lose weight and stay healthy, such as changing your diet and getting regular exercise. If you have a BMI lower than 18.5 · Your doctor may do other tests to check your risk for health problems. · Talk with your doctor about steps you can take to stay healthy or improve your health. You may need to make lifestyle changes to gain or maintain weight and stay healthy, such as getting more healthy foods in your diet and doing exercises to build muscle. Where can you learn more? Go to http://becca-jonathan.info/. Enter S176 in the search box to learn more about \"Body Mass Index: Care Instructions. \" Current as of: October 13, 2016 Content Version: 11.4 © 8801-1644 Healthwise, [employer]orporated. Care instructions adapted under license by [employer] (which disclaims liability or warranty for this information). If you have questions about a medical condition or this instruction, always ask your healthcare professional. [address] any warranty or liability for your use of this information.

## 2018-11-19 NOTE — PROGRESS NOTES
Discussed the patient's BMI with him. The BMI follow up plan is as follows:  
 
dietary management education, guidance, and counseling 
encourage exercise 
monitor weight 
prescribed dietary intake An After Visit Summary was printed and given to the patient.

## 2018-11-19 NOTE — PROGRESS NOTES
Je Telles is a 68 y.o. male Chief Complaint Patient presents with  Surgical Clearance Pre-Op for cataract surgery left eye 11/29/2018 and right eye 12/13/2018 1. Have you been to the ER, urgent care clinic since your last visit? Hospitalized since your last visit? No 
M 
2. Have you seen or consulted any other health care providers outside of the [address] [place] [address] since your last visit? Include any pap smears or colon screening. Dr Aria Penn at [facility] for cataract in both eyes Visit Vitals /86 (BP 1 Location: Left arm, BP Patient Position: Sitting) Pulse 71 Temp 97.7 °F (36.5 °C) (Oral) Resp 16 Ht 6' (1.829 m) Wt 236 lb 6.4 oz (107.2 kg) SpO2 96% BMI 32.06 kg/m² Health Maintenance Due Topic Date Due  Shingrix Vaccine Age 50> (1 of 2) 03/13/1995  GLAUCOMA SCREENING Q2Y  12/16/2018 Medication Reconciliation completed, changes noted.   Please  Update medication list.

## 2018-12-05 ENCOUNTER — OFFICE VISIT (OUTPATIENT)
Dept: INTERNAL MEDICINE CLINIC | Age: 73
End: 2018-12-05

## 2018-12-05 ENCOUNTER — HOSPITAL ENCOUNTER (OUTPATIENT)
Dept: LAB | Age: 73
Discharge: HOME | End: 2018-12-05
Payer: MEDICARE

## 2018-12-05 VITALS
OXYGEN SATURATION: 95 % | DIASTOLIC BLOOD PRESSURE: 83 MMHG | SYSTOLIC BLOOD PRESSURE: 124 MMHG | HEIGHT: 72 IN | HEART RATE: 72 BPM | WEIGHT: 237.2 LBS | RESPIRATION RATE: 18 BRPM | TEMPERATURE: 97.5 F | BODY MASS INDEX: 32.13 KG/M2

## 2018-12-05 DIAGNOSIS — C61 PROSTATE CANCER (HCC): ICD-10-CM

## 2018-12-05 DIAGNOSIS — I10 BENIGN ESSENTIAL HTN: ICD-10-CM

## 2018-12-05 DIAGNOSIS — Z00.00 MEDICARE ANNUAL WELLNESS VISIT, SUBSEQUENT: Primary | ICD-10-CM

## 2018-12-05 PROCEDURE — 85027 COMPLETE CBC AUTOMATED: CPT

## 2018-12-05 PROCEDURE — 80061 LIPID PANEL: CPT

## 2018-12-05 PROCEDURE — 84153 ASSAY OF PSA TOTAL: CPT

## 2018-12-05 PROCEDURE — 80053 COMPREHEN METABOLIC PANEL: CPT

## 2018-12-05 PROCEDURE — 36415 COLL VENOUS BLD VENIPUNCTURE: CPT

## 2018-12-05 RX ORDER — ZOSTER VACCINE RECOMBINANT, ADJUVANTED 50 MCG/0.5
0.5 KIT INTRAMUSCULAR ONCE
Qty: 0.5 ML | Refills: 1 | Status: SHIPPED | OUTPATIENT
Start: 2018-12-05 | End: 2018-12-05 | Stop reason: ALTCHOICE

## 2018-12-05 NOTE — PROGRESS NOTES
Melissa Garcia is a 68 y.o. male Fasting Chief Complaint Patient presents with [facility] Annual Wellness Visit 1. Have you been to the ER, urgent care clinic since your last visit? Hospitalized since your last visit? No 
M 
2. Have you seen or consulted any other health care providers outside of the [address] [place] [address] since your last visit? Include any pap smears or colon screening. No 
 
 
Visit Vitals /83 (BP 1 Location: Left arm, BP Patient Position: Sitting) Pulse 72 Temp 97.5 °F (36.4 °C) (Oral) Resp 18 Ht 6' (1.829 m) Wt 237 lb 3.2 oz (107.6 kg) SpO2 95% BMI 32.17 kg/m² Health Maintenance Due Topic Date Due  Shingrix Vaccine Age 50> (1 of 2) 03/13/1995  MEDICARE YEARLY EXAM  12/05/2018  GLAUCOMA SCREENING Q2Y  12/16/2018 Medication Reconciliation completed, changes noted.   Please  Update medication list.

## 2018-12-05 NOTE — ACP (ADVANCE CARE PLANNING)
Advance Care Planning (ACP) Provider Note - Comprehensive     Date of ACP Conversation: 12/05/18  Persons included in Conversation:  patient  Length of ACP Conversation in minutes:  <16 minutes (Non-Billable)    Authorized Decision Maker (if patient is incapable of making informed decisions): This person is:  Healthcare Agent/Medical Power of  under Advance Directive          General ACP for ALL Patients with Decision Making Capacity:   Importance of advance care planning, including choosing a healthcare agent to communicate patient's healthcare decisions if patient lost the ability to make decisions, such as after a sudden illness or accident  Understanding of the healthcare agent role was assessed and information provided  Exploration of values, goals, and preferences if recovery is not expected, even with continued medical treatment in the event of: Imminent death  Severe, permanent brain injury    Review of Existing Advance Directive:  not availble     For Serious or Chronic Illness:  Understanding of medical condition    Understanding of CPR, goals and expected outcomes, benefits and burdens discussed. Information on CPR success rates provided (e.g. for CPR in hospital, survival to d/c at two weeks is 22%, for chronically ill or elderly/frail survival is less than 3%); Individual asked to communicate understanding of information in his/her own words.   Explored fears and concerns regarding CPR or possible outcomes    Interventions Provided:  Recommended completion of Advance Directive form after review of ACP materials and conversation with prospective healthcare agent   Recommended communicating the plan and making copies for the healthcare agent, personal physician, and others as appropriate (e.g., health system)

## 2018-12-05 NOTE — PATIENT INSTRUCTIONS

## 2018-12-05 NOTE — PROGRESS NOTES
This is a Subsequent Medicare Annual Wellness Visit providing Personalized Prevention Plan Services (PPPS) (Performed 12 months after initial AWV and PPPS ) I have reviewed the patient's medical history in detail and updated the computerized patient record. Tolerated L cataract. R pending History Past Medical History:  
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   
 Cataract 2018  Elevated hemidiaphragm   
 chronic, 1985. saw Dr. Young Gonzalez at [facility]  
 History of prostate cancer 05/2002  
 radical prostatectomy at [facility]  History of rubella  Right hip pain Dr. Jose Antonio Allen, Dr. Michael Schuster. severe OA. MRI. injection helped  Rosacea  Umbilical hernia 0/26/8411 Past Surgical History:  
Procedure Laterality Date  HX CATARACT REMOVAL  11/29/2018  
 left eye  HX COLONOSCOPY  02/25/2005 Dr. Oleksandr Coreas  HX COLONOSCOPY  07/19/2017 Dr. Oleksandr Coreas.  tubular adenoma - repeat 5 years  HX HERNIA REPAIR  37/4077  
 umbilical, Dr. Masha Irving  HX HIP REPLACEMENT Right 02/12/2018 Dr. Michael Schuster  HX OTHER SURGICAL    
 right ear lobe removal of cancer cells  HX PROSTATECTOMY  5/2002 [facility] Current Outpatient Medications Medication Sig  
 folic acid/multivit-min/lutein (CENTRUM SILVER PO) Take 1 Tab by mouth.  lisinopril (PRINIVIL, ZESTRIL) 20 mg tablet TAKE ONE TABLET BY MOUTH EVERY DAY  CIALIS 5 mg tablet Take 1 Tab by mouth daily. (Patient not taking: Reported on 12/5/2018)  aspirin 81 mg chewable tablet Take 81 mg by mouth daily.  doxycycline (VIBRAMYCIN) 50 mg capsule TAKE ONE CAPSULE BY MOUTH EVERY DAY. (Patient not taking: Reported on 12/5/2018) No current facility-administered medications for this visit. No Known Allergies Family History Problem Relation Age of Onset  Cancer Mother 76  
     breast.  d.at 80  
 Cancer Father 79  
     pulmonary carcinomatous  Heart Disease Brother MI w stent  Hypertension Brother  Cancer Son Hodgkins dz  
 
 
 reports that  has never smoked. he has never used smokeless tobacco. 
 reports that he drinks about 2.5 oz of alcohol per week. Depression Risk Factor Screening:  
 
 
Alcohol Risk Factor Screening: On any occasion during the past 3 months, have you had more than 3 drinks containing alcohol? No 
 
Do you average more than 14 drinks per week? No 
 
 
Functional Ability and Level of Safety:  
 
Hearing Loss  
mild Activities of Daily Living Self-care. Requires assistance with: no ADLs Fall Risk Fall Risk Assessment, last 12 mths 12/5/2018 Able to walk? Yes Fall in past 12 months? No  
 
 
 
Abuse Screen Patient is not abused Review of Systems A comprehensive review of systems was negative except for that written in the HPI. Physical Examination Evaluation of Cognitive Function: 
Mood/affect:  neutral, happy Appearance: age appropriate Family member/caregiver input: none Blood pressure 124/83, pulse 72, temperature 97.5 °F (36.4 °C), temperature source Oral, resp. rate 18, height 6' (1.829 m), weight 237 lb 3.2 oz (107.6 kg), SpO2 95 %. General appearance: alert, cooperative, no distress, appears stated age Neck: supple, symmetrical, trachea midline, no adenopathy, thyroid: not enlarged, symmetric, no tenderness/mass/nodules, no carotid bruit and no JVD Lungs: clear to auscultation bilaterally Heart: regular rate and rhythm, S1, S2 normal, no murmur, click, rub or gallop Extremities: extremities normal, atraumatic, no cyanosis or edema Patient Care Team: 
Olegario Duvall MD as PCP - General (Internal Medicine) Advice/Referrals/Counseling Education and counseling provided. See below for specific orders Assessment/Plan Diagnoses and all orders for this visit: 
 
1. Medicare annual wellness visit, subsequent 
UTD. Get 2nd shingrix ACP discussion 2. Prostate cancer ([facility] [address] Currently asymptomatic Check PSA 3. Benign essential HTN Controlled on current regimen. Continue current medications as written in chart. Aleshia Albert Potential medication side effects were discussed with the patient; let me know if any occur. Return for yearly Annual Wellness Visits

## 2018-12-06 LAB
ALBUMIN SERPL-MCNC: 4.5 G/DL (ref 3.5–4.8)
ALBUMIN/GLOB SERPL: 2 {RATIO} (ref 1.2–2.2)
ALP SERPL-CCNC: 68 IU/L (ref 39–117)
ALT SERPL-CCNC: 24 IU/L (ref 0–44)
AST SERPL-CCNC: 31 IU/L (ref 0–40)
BILIRUB SERPL-MCNC: 0.5 MG/DL (ref 0–1.2)
BUN SERPL-MCNC: 29 MG/DL (ref 8–27)
BUN/CREAT SERPL: 33 (ref 10–24)
CALCIUM SERPL-MCNC: 9.4 MG/DL (ref 8.6–10.2)
CHLORIDE SERPL-SCNC: 102 MMOL/L (ref 96–106)
CHOLEST SERPL-MCNC: 187 MG/DL (ref 100–199)
CO2 SERPL-SCNC: 24 MMOL/L (ref 20–29)
CREAT SERPL-MCNC: 0.88 MG/DL (ref 0.76–1.27)
ERYTHROCYTE [DISTWIDTH] IN BLOOD BY AUTOMATED COUNT: 14.3 % (ref 12.3–15.4)
GLOBULIN SER CALC-MCNC: 2.2 G/DL (ref 1.5–4.5)
GLUCOSE SERPL-MCNC: 107 MG/DL (ref 65–99)
HCT VFR BLD AUTO: 41.7 % (ref 37.5–51)
HDLC SERPL-MCNC: 56 MG/DL
HGB BLD-MCNC: 13.8 G/DL (ref 13–17.7)
INTERPRETATION, 910389: NORMAL
LDLC SERPL CALC-MCNC: 116 MG/DL (ref 0–99)
MCH RBC QN AUTO: 29.6 PG (ref 26.6–33)
MCHC RBC AUTO-ENTMCNC: 33.1 G/DL (ref 31.5–35.7)
MCV RBC AUTO: 90 FL (ref 79–97)
PLATELET # BLD AUTO: 226 X10E3/UL (ref 150–379)
POTASSIUM SERPL-SCNC: 5.2 MMOL/L (ref 3.5–5.2)
PROT SERPL-MCNC: 6.7 G/DL (ref 6–8.5)
PSA SERPL-MCNC: <0.1 NG/ML (ref 0–4)
RBC # BLD AUTO: 4.66 X10E6/UL (ref 4.14–5.8)
REFLEX CRITERIA: NORMAL
SODIUM SERPL-SCNC: 140 MMOL/L (ref 134–144)
TRIGL SERPL-MCNC: 74 MG/DL (ref 0–149)
VLDLC SERPL CALC-MCNC: 15 MG/DL (ref 5–40)
WBC # BLD AUTO: 5.7 X10E3/UL (ref 3.4–10.8)

## 2019-02-16 DIAGNOSIS — I10 BENIGN ESSENTIAL HTN: ICD-10-CM

## 2019-02-18 RX ORDER — LISINOPRIL 20 MG/1
TABLET ORAL
Qty: 90 TAB | Refills: 1 | Status: SHIPPED | OUTPATIENT
Start: 2019-02-18 | End: 2019-06-06 | Stop reason: ALTCHOICE

## 2019-06-06 ENCOUNTER — OFFICE VISIT (OUTPATIENT)
Dept: INTERNAL MEDICINE CLINIC | Age: 74
End: 2019-06-06

## 2019-06-06 VITALS
SYSTOLIC BLOOD PRESSURE: 118 MMHG | HEIGHT: 72 IN | BODY MASS INDEX: 30.83 KG/M2 | RESPIRATION RATE: 18 BRPM | DIASTOLIC BLOOD PRESSURE: 77 MMHG | HEART RATE: 70 BPM | TEMPERATURE: 98 F | WEIGHT: 227.6 LBS | OXYGEN SATURATION: 95 %

## 2019-06-06 DIAGNOSIS — Z85.46 HISTORY OF PROSTATE CANCER: ICD-10-CM

## 2019-06-06 DIAGNOSIS — I10 BENIGN ESSENTIAL HTN: Primary | ICD-10-CM

## 2019-06-06 DIAGNOSIS — R05.9 COUGH: ICD-10-CM

## 2019-06-06 RX ORDER — TELMISARTAN 20 MG/1
20 TABLET ORAL DAILY
Qty: 90 TAB | Refills: 0 | Status: SHIPPED | OUTPATIENT
Start: 2019-06-06 | End: 2019-12-06 | Stop reason: ALTCHOICE

## 2019-06-06 NOTE — PROGRESS NOTES
HISTORY OF PRESENT ILLNESS    Chief Complaint   Patient presents with    Hypertension     f/u    Cough     bothering him again       Presents for follow-up    Report his wife's mother is critically ill. Cough recurred. Taking lisinopril  Occurs w inhalation. Dry. No SOB, fever, or chills . Has mild PND. Last CXR 6/2016 normal L hemidiaphragm elevated (chronic)  Has R hip replacement 1/2018 - xray done? Taking lisinopril for years  Taking zyrtec with no change. Taking nyquil  Tried flonase w/o much improvement    Hypertension  Hypertension ROS: taking medications as instructed, no medication side effects noted, no TIA's, no chest pain on exertion, no dyspnea on exertion, no swelling of ankles     reports that he has never smoked. He has never used smokeless tobacco.    reports that he drinks about 2.5 oz of alcohol per week. BP Readings from Last 2 Encounters:   06/06/19 118/77   12/05/18 124/83       Review of Systems   All other systems reviewed and are negative, except as noted in HPI    Past Medical and Surgical History   has a past medical history of Basal cell carcinoma (BCC) of antitragus of right ear (06/29/2018), Benign essential HTN, Cataract (2018), Elevated hemidiaphragm, History of prostate cancer (05/2002), History of rubella, Right hip pain, Rosacea, and Umbilical hernia (2/10/9501). has a past surgical history that includes hx prostatectomy (5/2002); hx colonoscopy (02/25/2005); hx colonoscopy (07/19/2017); hx hernia repair (05/2012); hx hip replacement (Right, 02/12/2018); hx other surgical; and hx cataract removal (11/29/2018). reports that he has never smoked. He has never used smokeless tobacco. He reports that he drinks about 2.5 oz of alcohol per week. He reports that he does not use drugs. family history includes Cancer in his son;  Cancer (age of onset: 79) in his father; Cancer (age of onset: 76) in his mother; Heart Disease in his brother; Hypertension in his brother. Physical Exam   Nursing note and vitals reviewed. Blood pressure 118/77, pulse 70, temperature 98 °F (36.7 °C), temperature source Oral, resp. rate 18, height 6' (1.829 m), weight 227 lb 9.6 oz (103.2 kg), SpO2 95 %. Constitutional:  No distress. Eyes: Conjunctivae are normal.   Ears:  Hearing grossly intact  Cardiovascular: Normal rate. regular rhythm, no murmurs or gallops  No edema  Pulmonary/Chest: Effort normal.   CTAB  Musculoskeletal: moves all 4 extremities   Neurological: Alert and oriented to person, place, and time. Skin: No rash noted. Psychiatric: Normal mood and affect. Behavior is normal.     ASSESSMENT and PLAN  Diagnoses and all orders for this visit:    1. Benign essential HTN  Blood pressure is controlled, but chronic cough may be exacerbated by lisinopril. Will discontinue ACE inhibitor lisinopril and start telmisartan. Side effects discussed. 2. Cough  Suspect this is multifactorial from postnasal drip, perhaps mild GERD, lisinopril effects. Will discontinue lisinopril. Given 1 to 2 months and see ENT if continuing. It sounds like he is coming from his throat level. Previous checks x-ray was normal, but could consider repeat imaging if persists.  -     REFERRAL TO ENT-OTOLARYNGOLOGY    3. History of prostate cancer  Resolved. He prefers PSA, but not really medically needed. Will do in December per his preference. .        lab results and schedule of future lab studies reviewed with patient  reviewed medications and side effects in detail    Return to clinic for further evaluation if new symptoms develop        Current Outpatient Medications   Medication Sig    lisinopril (PRINIVIL, ZESTRIL) 20 mg tablet TAKE ONE TABLET BY MOUTH EVERY DAY    folic acid/multivit-min/lutein (CENTRUM SILVER PO) Take 1 Tab by mouth. No current facility-administered medications for this visit. Discussed the patient's BMI with him.   The BMI follow up plan is as follows: dietary management education, guidance, and counseling  encourage exercise  monitor weight  prescribed dietary intake    An After Visit Summary was printed and given to the patient.

## 2019-06-06 NOTE — PATIENT INSTRUCTIONS
Body Mass Index: Care Instructions Your Care Instructions Body mass index (BMI) can help you see if your weight is raising your risk for health problems. It uses a formula to compare how much you weigh with how tall you are. · A BMI lower than 18.5 is considered underweight. · A BMI between 18.5 and 24.9 is considered healthy. · A BMI between 25 and 29.9 is considered overweight. A BMI of 30 or higher is considered obese. If your BMI is in the normal range, it means that you have a lower risk for weight-related health problems. If your BMI is in the overweight or obese range, you may be at increased risk for weight-related health problems, such as high blood pressure, heart disease, stroke, arthritis or joint pain, and diabetes. If your BMI is in the underweight range, you may be at increased risk for health problems such as fatigue, lower protection (immunity) against illness, muscle loss, bone loss, hair loss, and hormone problems. BMI is just one measure of your risk for weight-related health problems. You may be at higher risk for health problems if you are not active, you eat an unhealthy diet, or you drink too much alcohol or use tobacco products. Follow-up care is a key part of your treatment and safety. Be sure to make and go to all appointments, and call your doctor if you are having problems. It's also a good idea to know your test results and keep a list of the medicines you take. How can you care for yourself at home? · Practice healthy eating habits. This includes eating plenty of fruits, vegetables, whole grains, lean protein, and low-fat dairy. · If your doctor recommends it, get more exercise. Walking is a good choice. Bit by bit, increase the amount you walk every day. Try for at least 30 minutes on most days of the week. · Do not smoke. Smoking can increase your risk for health problems.  If you need help quitting, talk to your doctor about stop-smoking programs and medicines. These can increase your chances of quitting for good. · Limit alcohol to 2 drinks a day for men and 1 drink a day for women. Too much alcohol can cause health problems. If you have a BMI higher than 25 · Your doctor may do other tests to check your risk for weight-related health problems. This may include measuring the distance around your waist. A waist measurement of more than 40 inches in men or 35 inches in women can increase the risk of weight-related health problems. · Talk with your doctor about steps you can take to stay healthy or improve your health. You may need to make lifestyle changes to lose weight and stay healthy, such as changing your diet and getting regular exercise. If you have a BMI lower than 18.5 · Your doctor may do other tests to check your risk for health problems. · Talk with your doctor about steps you can take to stay healthy or improve your health. You may need to make lifestyle changes to gain or maintain weight and stay healthy, such as getting more healthy foods in your diet and doing exercises to build muscle. Where can you learn more? Go to http://becca-jonathan.info/. Enter S176 in the search box to learn more about \"Body Mass Index: Care Instructions. \" Current as of: October 13, 2016 Content Version: 11.4 © 9656-0885 [employer]. Care instructions adapted under license by [employer] (which disclaims liability or warranty for this information). If you have questions about a medical condition or this instruction, always ask your healthcare professional. [address] any warranty or liability for your use of this information.

## 2019-12-06 ENCOUNTER — HOSPITAL ENCOUNTER (OUTPATIENT)
Dept: GENERAL RADIOLOGY | Age: 74
Discharge: HOME | End: 2019-12-06
Attending: INTERNAL MEDICINE
Payer: MEDICARE

## 2019-12-06 ENCOUNTER — HOSPITAL ENCOUNTER (OUTPATIENT)
Dept: LAB | Age: 74
Discharge: HOME | End: 2019-12-06

## 2019-12-06 ENCOUNTER — OFFICE VISIT (OUTPATIENT)
Dept: INTERNAL MEDICINE CLINIC | Age: 74
End: 2019-12-06

## 2019-12-06 VITALS
TEMPERATURE: 97.9 F | RESPIRATION RATE: 18 BRPM | BODY MASS INDEX: 31.42 KG/M2 | OXYGEN SATURATION: 96 % | HEART RATE: 69 BPM | WEIGHT: 232 LBS | DIASTOLIC BLOOD PRESSURE: 83 MMHG | SYSTOLIC BLOOD PRESSURE: 129 MMHG | HEIGHT: 72 IN

## 2019-12-06 DIAGNOSIS — Z85.46 HISTORY OF PROSTATE CANCER: ICD-10-CM

## 2019-12-06 DIAGNOSIS — Z13.31 SCREENING FOR DEPRESSION: ICD-10-CM

## 2019-12-06 DIAGNOSIS — I10 BENIGN ESSENTIAL HTN: ICD-10-CM

## 2019-12-06 DIAGNOSIS — R05.9 COUGH: ICD-10-CM

## 2019-12-06 DIAGNOSIS — Z00.00 MEDICARE ANNUAL WELLNESS VISIT, SUBSEQUENT: ICD-10-CM

## 2019-12-06 DIAGNOSIS — Z00.00 MEDICARE ANNUAL WELLNESS VISIT, SUBSEQUENT: Primary | ICD-10-CM

## 2019-12-06 LAB
ALBUMIN SERPL-MCNC: 3.9 G/DL (ref 3.5–5)
ALBUMIN/GLOB SERPL: 1.3 {RATIO} (ref 1.1–2.2)
ALP SERPL-CCNC: 72 U/L (ref 45–117)
ALT SERPL-CCNC: 30 U/L (ref 12–78)
ANION GAP SERPL CALC-SCNC: 5 MMOL/L (ref 5–15)
AST SERPL-CCNC: 26 U/L (ref 15–37)
BILIRUB SERPL-MCNC: 0.5 MG/DL (ref 0.2–1)
BUN SERPL-MCNC: 23 MG/DL (ref 6–20)
BUN/CREAT SERPL: 29 (ref 12–20)
CALCIUM SERPL-MCNC: 8.8 MG/DL (ref 8.5–10.1)
CHLORIDE SERPL-SCNC: 107 MMOL/L (ref 97–108)
CHOLEST SERPL-MCNC: 208 MG/DL
CO2 SERPL-SCNC: 27 MMOL/L (ref 21–32)
CREAT SERPL-MCNC: 0.78 MG/DL (ref 0.7–1.3)
ERYTHROCYTE [DISTWIDTH] IN BLOOD BY AUTOMATED COUNT: 14.4 % (ref 11.5–14.5)
GLOBULIN SER CALC-MCNC: 2.9 G/DL (ref 2–4)
GLUCOSE SERPL-MCNC: 105 MG/DL (ref 65–100)
HCT VFR BLD AUTO: 42.1 % (ref 36.6–50.3)
HDLC SERPL-MCNC: 53 MG/DL
HDLC SERPL: 3.9 {RATIO} (ref 0–5)
HGB BLD-MCNC: 13.6 G/DL (ref 12.1–17)
LDLC SERPL CALC-MCNC: 139.2 MG/DL (ref 0–100)
LIPID PROFILE,FLP: ABNORMAL
MCH RBC QN AUTO: 30.2 PG (ref 26–34)
MCHC RBC AUTO-ENTMCNC: 32.3 G/DL (ref 30–36.5)
MCV RBC AUTO: 93.3 FL (ref 80–99)
NRBC # BLD: 0 K/UL (ref 0–0.01)
NRBC BLD-RTO: 0 PER 100 WBC
PLATELET # BLD AUTO: 154 K/UL (ref 150–400)
PMV BLD AUTO: 12.2 FL (ref 8.9–12.9)
POTASSIUM SERPL-SCNC: 4.3 MMOL/L (ref 3.5–5.1)
PROT SERPL-MCNC: 6.8 G/DL (ref 6.4–8.2)
RBC # BLD AUTO: 4.51 M/UL (ref 4.1–5.7)
SODIUM SERPL-SCNC: 139 MMOL/L (ref 136–145)
TRIGL SERPL-MCNC: 79 MG/DL (ref ?–150)
VLDLC SERPL CALC-MCNC: 15.8 MG/DL
WBC # BLD AUTO: 5.5 K/UL (ref 4.1–11.1)

## 2019-12-06 PROCEDURE — 71046 X-RAY EXAM CHEST 2 VIEWS: CPT

## 2019-12-06 RX ORDER — LISINOPRIL 20 MG/1
TABLET ORAL DAILY
COMMUNITY
End: 2019-12-11 | Stop reason: SDUPTHER

## 2019-12-06 NOTE — ACP (ADVANCE CARE PLANNING)
Advance Care Planning (ACP) Provider Note - Comprehensive     Date of ACP Conversation: 12/06/19  Persons included in Conversation:  patient  Length of ACP Conversation in minutes:  <16 minutes (Non-Billable)    Authorized Decision Maker (if patient is incapable of making informed decisions): This person is:  Healthcare Agent/Medical Power of  under Advance Directive          General ACP for ALL Patients with Decision Making Capacity:   Importance of advance care planning, including choosing a healthcare agent to communicate patient's healthcare decisions if patient lost the ability to make decisions, such as after a sudden illness or accident  Understanding of the healthcare agent role was assessed and information provided  Exploration of values, goals, and preferences if recovery is not expected, even with continued medical treatment in the event of: Imminent death  Severe, permanent brain injury    Review of Existing Advance Directive:  not availble     For Serious or Chronic Illness:  Understanding of medical condition    Understanding of CPR, goals and expected outcomes, benefits and burdens discussed. Information on CPR success rates provided (e.g. for CPR in hospital, survival to d/c at two weeks is 22%, for chronically ill or elderly/frail survival is less than 3%); Individual asked to communicate understanding of information in his/her own words.   Explored fears and concerns regarding CPR or possible outcomes    Interventions Provided:  Recommended completion of Advance Directive form after review of ACP materials and conversation with prospective healthcare agent   Recommended communicating the plan and making copies for the healthcare agent, personal physician, and others as appropriate (e.g., health system)

## 2019-12-06 NOTE — PATIENT INSTRUCTIONS
Medicare Wellness Visit, Male The best way to live healthy is to have a lifestyle where you eat a well-balanced diet, exercise regularly, limit alcohol use, and quit all forms of tobacco/nicotine, if applicable. Regular preventive services are another way to keep healthy. Preventive services (vaccines, screening tests, monitoring & exams) can help personalize your care plan, which helps you manage your own care. Screening tests can find health problems at the earliest stages, when they are easiest to treat. [facility] follows the current, evidence-based guidelines published by the [facility] ([facility]) when recommending preventive services for our patients. Because we follow these guidelines, sometimes recommendations change over time as research supports it. (For example, a prostate screening blood test is no longer routinely recommended for men with no symptoms). Of course, you and your doctor may decide to screen more often for some diseases, based on your risk and co-morbidities (chronic disease you are already diagnosed with). Preventive services for you include: - Medicare offers their members a free annual wellness visit, which is time for you and your primary care provider to discuss and plan for your preventive service needs. Take advantage of this benefit every year! 
-All adults over age 72 should receive the recommended pneumonia vaccines. Current USPSTF guidelines recommend a series of two vaccines for the best pneumonia protection.  
-All adults should have a flu vaccine yearly and tetanus vaccine every 10 years. 
-All adults age 48 and older should receive the shingles vaccines (series of two vaccines).       
-All adults age 38-68 who are overweight should have a diabetes screening test once every three years.  
-Other screening tests & preventive services for persons with diabetes include: an eye exam to screen for diabetic retinopathy, a kidney function test, a foot exam, and stricter control over your cholesterol.  
-Cardiovascular screening for adults with routine risk involves an electrocardiogram (ECG) at intervals determined by the provider.  
-Colorectal cancer screening should be done for adults age 54-65 with no increased risk factors for colorectal cancer. There are a number of acceptable methods of screening for this type of cancer. Each test has its own benefits and drawbacks. Discuss with your provider what is most appropriate for you during your annual wellness visit. The different tests include: colonoscopy (considered the best screening method), a fecal occult blood test, a fecal DNA test, and sigmoidoscopy. 
-All adults born between [facility] should be screened once for Hepatitis C. 
-An Abdominal Aortic Aneurysm (AAA) Screening is recommended for men age 73-68 who has ever smoked in their lifetime. Here is a list of your current Health Maintenance items (your personalized list of preventive services) with a due date: There are no preventive care reminders to display for this patient. Body Mass Index: Care Instructions Your Care Instructions Body mass index (BMI) can help you see if your weight is raising your risk for health problems. It uses a formula to compare how much you weigh with how tall you are. · A BMI lower than 18.5 is considered underweight. · A BMI between 18.5 and 24.9 is considered healthy. · A BMI between 25 and 29.9 is considered overweight. A BMI of 30 or higher is considered obese. If your BMI is in the normal range, it means that you have a lower risk for weight-related health problems.  If your BMI is in the overweight or obese range, you may be at increased risk for weight-related health problems, such as high blood pressure, heart disease, stroke, arthritis or joint pain, and diabetes. If your BMI is in the underweight range, you may be at increased risk for health problems such as fatigue, lower protection (immunity) against illness, muscle loss, bone loss, hair loss, and hormone problems. BMI is just one measure of your risk for weight-related health problems. You may be at higher risk for health problems if you are not active, you eat an unhealthy diet, or you drink too much alcohol or use tobacco products. Follow-up care is a key part of your treatment and safety. Be sure to make and go to all appointments, and call your doctor if you are having problems. It's also a good idea to know your test results and keep a list of the medicines you take. How can you care for yourself at home? · Practice healthy eating habits. This includes eating plenty of fruits, vegetables, whole grains, lean protein, and low-fat dairy. · If your doctor recommends it, get more exercise. Walking is a good choice. Bit by bit, increase the amount you walk every day. Try for at least 30 minutes on most days of the week. · Do not smoke. Smoking can increase your risk for health problems. If you need help quitting, talk to your doctor about stop-smoking programs and medicines. These can increase your chances of quitting for good. · Limit alcohol to 2 drinks a day for men and 1 drink a day for women. Too much alcohol can cause health problems. If you have a BMI higher than 25 · Your doctor may do other tests to check your risk for weight-related health problems. This may include measuring the distance around your waist. A waist measurement of more than 40 inches in men or 35 inches in women can increase the risk of weight-related health problems. · Talk with your doctor about steps you can take to stay healthy or improve your health. You may need to make lifestyle changes to lose weight and stay healthy, such as changing your diet and getting regular exercise. If you have a BMI lower than 18.5 · Your doctor may do other tests to check your risk for health problems. · Talk with your doctor about steps you can take to stay healthy or improve your health. You may need to make lifestyle changes to gain or maintain weight and stay healthy, such as getting more healthy foods in your diet and doing exercises to build muscle. Where can you learn more? Go to http://becca-jonathan.info/. Enter S176 in the search box to learn more about \"Body Mass Index: Care Instructions. \" Current as of: October 13, 2016 Content Version: 11.4 © 3950-5846 [employer]. Care instructions adapted under license by [employer] (which disclaims liability or warranty for this information). If you have questions about a medical condition or this instruction, always ask your healthcare professional. [address] any warranty or liability for your use of this information. Well Visit, Over 72: Care Instructions Your Care Instructions Physical exams can help you stay healthy. Your doctor has checked your overall health and may have suggested ways to take good care of yourself. He or she also may have recommended tests. At home, you can help prevent illness with healthy eating, regular exercise, and other steps. Follow-up care is a key part of your treatment and safety. Be sure to make and go to all appointments, and call your doctor if you are having problems. It's also a good idea to know your test results and keep a list of the medicines you take. How can you care for yourself at home? · Reach and stay at a healthy weight. This will lower your risk for many problems, such as obesity, diabetes, heart disease, and high blood pressure. · Get at least 30 minutes of exercise on most days of the week. Walking is a good choice.  You also may want to do other activities, such as running, swimming, cycling, or playing tennis or team sports. · Do not smoke. Smoking can make health problems worse. If you need help quitting, talk to your doctor about stop-smoking programs and medicines. These can increase your chances of quitting for good. · Protect your skin from too much sun. When you're outdoors from 10 a.m. to 4 p.m., stay in the shade or cover up with clothing and a hat with a wide brim. Wear sunglasses that block UV rays. Even when it's cloudy, put broad-spectrum sunscreen (SPF 30 or higher) on any exposed skin. · See a dentist one or two times a year for checkups and to have your teeth cleaned. · Wear a seat belt in the car. Follow your doctor's advice about when to have certain tests. These tests can spot problems early. For men and women · Cholesterol. Your doctor will tell you how often to have this done based on your overall health and other things that can increase your risk for heart attack and stroke. · Blood pressure. Have your blood pressure checked during a routine doctor visit. Your doctor will tell you how often to check your blood pressure based on your age, your blood pressure results, and other factors. · Diabetes. Ask your doctor whether you should have tests for diabetes. · Vision. Experts recommend that you have yearly exams for glaucoma and other age-related eye problems. · Hearing. Tell your doctor if you notice any change in your hearing. You can have tests to find out how well you hear. · Colon cancer tests. Keep having colon cancer tests as your doctor recommends. You can have one of several types of tests. · Heart attack and stroke risk. At least every 4 to 6 years, you should have your risk for heart attack and stroke assessed. Your doctor uses factors such as your age, blood pressure, cholesterol, and whether you smoke or have diabetes to show what your risk for a heart attack or stroke is over the next 10 years. · Osteoporosis. Talk to your doctor about whether you should have a bone density test to find out whether you have thinning bones. Also ask your doctor about whether you should take calcium and vitamin D supplements. For women · Pap test and pelvic exam. You may no longer need a Pap test. Talk with your doctor about whether to stop or continue to have Pap tests. · Breast exam and mammogram. Ask how often you should have a mammogram, which is an X-ray of your breasts. A mammogram can spot breast cancer before it can be felt and when it is easiest to treat. · Thyroid disease. Talk to your doctor about whether to have your thyroid checked as part of a regular physical exam. Women have an increased chance of a thyroid problem. For men · Prostate exam. Talk to your doctor about whether you should have a blood test (called a PSA test) for prostate cancer. Experts recommend that you discuss the benefits and risks of the test with your doctor before you decide whether to have this test. Some experts say that men ages 79 and older no longer need testing. · Abdominal aortic aneurysm. Ask your doctor whether you should have a test to check for an aneurysm. You may need a test if you ever smoked or if your parent, brother, sister, or child has had an aneurysm. When should you call for help? Watch closely for changes in your health, and be sure to contact your doctor if you have any problems or symptoms that concern you. Where can you learn more? Go to http://becca-jonathan.info/. Enter I261 in the search box to learn more about \"Well Visit, Over 65: Care Instructions. \" Current as of: December 13, 2018 Content Version: 12.2 © 6610-9899 [employer]. Care instructions adapted under license by [employer] (which disclaims liability or warranty for this information).  If you have questions about a medical condition or this instruction, always ask your healthcare professional. [place] [address] any warranty or liability for your use of this information.
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
## 2019-12-07 LAB
PSA SERPL-MCNC: <0.1 NG/ML (ref 0–4)
REFLEX CRITERIA: NORMAL

## 2019-12-08 DIAGNOSIS — J84.9 INTERSTITIAL LUNG DISEASE (HCC): ICD-10-CM

## 2019-12-08 DIAGNOSIS — R05.9 COUGH: Primary | ICD-10-CM

## 2019-12-11 RX ORDER — LISINOPRIL 20 MG/1
20 TABLET ORAL DAILY
Qty: 90 TAB | Refills: 1 | Status: SHIPPED | OUTPATIENT
Start: 2019-12-11 | End: 2020-06-08

## 2019-12-13 ENCOUNTER — HOSPITAL ENCOUNTER (OUTPATIENT)
Dept: CT IMAGING | Age: 74
Discharge: HOME | End: 2019-12-13
Attending: INTERNAL MEDICINE
Payer: MEDICARE

## 2019-12-13 DIAGNOSIS — R05.9 COUGH: ICD-10-CM

## 2019-12-13 DIAGNOSIS — J84.9 INTERSTITIAL LUNG DISEASE (HCC): ICD-10-CM

## 2019-12-13 PROCEDURE — 74011636320 HC RX REV CODE- 636/320: Performed by: INTERNAL MEDICINE

## 2019-12-13 PROCEDURE — 71260 CT THORAX DX C+: CPT

## 2019-12-13 RX ADMIN — IOPAMIDOL 100 ML: 755 INJECTION, SOLUTION INTRAVENOUS at 10:17

## 2019-12-15 PROBLEM — J84.9 INTERSTITIAL LUNG DISEASE (HCC): Status: ACTIVE | Noted: 2019-12-01

## 2019-12-17 DIAGNOSIS — R05.9 COUGH: ICD-10-CM

## 2019-12-17 DIAGNOSIS — J84.9 INTERSTITIAL LUNG DISEASE (HCC): Primary | ICD-10-CM

## 2020-06-08 ENCOUNTER — OFFICE VISIT (OUTPATIENT)
Dept: INTERNAL MEDICINE CLINIC | Age: 75
End: 2020-06-08

## 2020-06-08 VITALS
HEIGHT: 72 IN | HEART RATE: 77 BPM | TEMPERATURE: 98 F | BODY MASS INDEX: 30.94 KG/M2 | OXYGEN SATURATION: 96 % | RESPIRATION RATE: 12 BRPM | DIASTOLIC BLOOD PRESSURE: 66 MMHG | WEIGHT: 228.4 LBS | SYSTOLIC BLOOD PRESSURE: 130 MMHG

## 2020-06-08 DIAGNOSIS — J84.10 PULMONARY FIBROSIS (HCC): Primary | ICD-10-CM

## 2020-06-08 DIAGNOSIS — R05.9 COUGH: ICD-10-CM

## 2020-06-08 DIAGNOSIS — I10 BENIGN ESSENTIAL HTN: ICD-10-CM

## 2020-06-08 RX ORDER — NINTEDANIB 150 MG/1
CAPSULE ORAL 2 TIMES DAILY
COMMUNITY

## 2020-06-08 RX ORDER — LISINOPRIL 20 MG/1
TABLET ORAL
Qty: 90 TAB | Refills: 3 | Status: SHIPPED | OUTPATIENT
Start: 2020-06-08 | End: 2020-08-31 | Stop reason: SDUPTHER

## 2020-06-08 RX ORDER — BENZONATATE 200 MG/1
200 CAPSULE ORAL
Qty: 60 CAP | Refills: 3 | Status: SHIPPED | OUTPATIENT
Start: 2020-06-08 | End: 2021-12-09 | Stop reason: ALTCHOICE

## 2020-06-08 RX ORDER — LISINOPRIL 20 MG/1
TABLET ORAL
Qty: 90 TAB | Refills: 0 | Status: SHIPPED | OUTPATIENT
Start: 2020-06-08 | End: 2020-06-08 | Stop reason: SDUPTHER

## 2020-06-08 NOTE — PROGRESS NOTES
HISTORY OF PRESENT ILLNESS    Chief Complaint   Patient presents with    Hypertension       Presents for follow-up    Chronic cough is stable. Pulmonary fibrosis. Dx 2019. Saw Dr. Blayne Porras. Started OFEV. Tolerating med with no side effects. His wife  of this disease and it worries him more. Wt Readings from Last 3 Encounters:   20 228 lb 6.4 oz (103.6 kg)   19 232 lb (105.2 kg)   19 227 lb 9.6 oz (103.2 kg)     Hypertension  Hypertension ROS: taking medications as instructed, no medication side effects noted, no TIA's, no chest pain on exertion, no dyspnea on exertion, no swelling of ankles     reports that he has never smoked. He has never used smokeless tobacco.    reports current alcohol use of about 4.2 standard drinks of alcohol per week. BP Readings from Last 2 Encounters:   20 130/66   19 129/83         Review of Systems   All other systems reviewed and are negative, except as noted in HPI    Past Medical and Surgical History   has a past medical history of Basal cell carcinoma (BCC) of antitragus of right ear (2018), Benign essential HTN, Cataract (), Elevated hemidiaphragm, History of prostate cancer (2002), History of rubella, Interstitial lung disease ([address] (2019), Pulmonary fibrosis ([facility] [address] Right hip pain, Rosacea, and Umbilical hernia (). has a past surgical history that includes hx prostatectomy (2002); hx colonoscopy (2005); hx colonoscopy (2017); hx hernia repair (2012); hx hip replacement (Right, 2018); hx other surgical; hx cataract removal (Left, 2018); and hx cataract removal (Right, ). reports that he has never smoked. He has never used smokeless tobacco. He reports current alcohol use of about 4.2 standard drinks of alcohol per week. He reports that he does not use drugs. family history includes Cancer in his son;  Cancer (age of onset: 79) in his father; Cancer (age of onset: 76) in his mother; Heart Disease in his brother; Hypertension in his brother. Physical Exam   Nursing note and vitals reviewed. Blood pressure 130/66, pulse 77, temperature 98 °F (36.7 °C), temperature source Oral, resp. rate 12, height 6' (1.829 m), weight 228 lb 6.4 oz (103.6 kg), SpO2 96 %. Constitutional:  No distress. Eyes: Conjunctivae are normal.   Ears:  Hearing grossly intact  Cardiovascular: Normal rate. regular rhythm, no murmurs or gallops  No edema  Pulmonary/Chest: Effort normal.   CTAB  Musculoskeletal: moves all 4 extremities   Neurological: Alert and oriented to person, place, and time. Skin: No rash noted. Psychiatric: Normal mood and affect. Behavior is normal.     ASSESSMENT and PLAN  Diagnoses and all orders for this visit:    1. Pulmonary fibrosis ([address]  Remains symptomatically stable. Seeing pulmonary Dr. Ranulfo Morris. Tolerating well. 2. Benign essential HTN  Controlled on current regimen. Continue current medications as written in chart. Chronic cough is not related based on previous trials off of medication.  -     lisinopriL (PRINIVIL, ZESTRIL) 20 mg tablet; TAKE ONE TABLET BY MOUTH DAILY    3. Cough   Chronic cough. Likely radiates pulmonary fibrosis but unclear overall etiology. Stopping ACE inhibitor in the past has not helped. Trial of Tessalon. Follow-up with pulmonary.  -     benzonatate (TESSALON) 200 mg capsule; Take 1 Cap by mouth three (3) times daily as needed for Cough. There are no Patient Instructions on file for this visit.    lab results and schedule of future lab studies reviewed with patient  reviewed medications and side effects in detail    Return to clinic for further evaluation if new symptoms develop        Current Outpatient Medications   Medication Sig    lisinopriL (PRINIVIL, ZESTRIL) 20 mg tablet TAKE ONE TABLET BY MOUTH DAILY    nintedanib (Ofev) 150 mg cap Take  by mouth two (2) times a day.     folic acid/multivit-min/lutein (CENTRUM SILVER PO) Take 1 Tab by mouth.  famotidine (PEPCID PO) Take  by mouth. Indications: not taking     No current facility-administered medications for this visit.

## 2020-08-31 DIAGNOSIS — I10 BENIGN ESSENTIAL HTN: ICD-10-CM

## 2020-08-31 RX ORDER — LISINOPRIL 20 MG/1
TABLET ORAL
Qty: 90 TAB | Refills: 3 | Status: SHIPPED | OUTPATIENT
Start: 2020-08-31 | End: 2021-06-09 | Stop reason: SDUPTHER

## 2020-12-08 ENCOUNTER — OFFICE VISIT (OUTPATIENT)
Dept: INTERNAL MEDICINE CLINIC | Age: 75
End: 2020-12-08
Payer: MEDICARE

## 2020-12-08 VITALS
HEIGHT: 72 IN | OXYGEN SATURATION: 96 % | TEMPERATURE: 97.8 F | DIASTOLIC BLOOD PRESSURE: 67 MMHG | HEART RATE: 71 BPM | SYSTOLIC BLOOD PRESSURE: 136 MMHG | WEIGHT: 225.8 LBS | BODY MASS INDEX: 30.58 KG/M2 | RESPIRATION RATE: 13 BRPM

## 2020-12-08 DIAGNOSIS — J84.10 PULMONARY FIBROSIS (HCC): ICD-10-CM

## 2020-12-08 DIAGNOSIS — Z00.00 MEDICARE ANNUAL WELLNESS VISIT, SUBSEQUENT: Primary | ICD-10-CM

## 2020-12-08 DIAGNOSIS — Z85.46 HISTORY OF PROSTATE CANCER: ICD-10-CM

## 2020-12-08 DIAGNOSIS — Z87.39 HISTORY OF BACK PAIN: ICD-10-CM

## 2020-12-08 DIAGNOSIS — I10 BENIGN ESSENTIAL HTN: ICD-10-CM

## 2020-12-08 PROCEDURE — G8432 DEP SCR NOT DOC, RNG: HCPCS | Performed by: INTERNAL MEDICINE

## 2020-12-08 PROCEDURE — G0439 PPPS, SUBSEQ VISIT: HCPCS | Performed by: INTERNAL MEDICINE

## 2020-12-08 PROCEDURE — G8417 CALC BMI ABV UP PARAM F/U: HCPCS | Performed by: INTERNAL MEDICINE

## 2020-12-08 PROCEDURE — G8754 DIAS BP LESS 90: HCPCS | Performed by: INTERNAL MEDICINE

## 2020-12-08 PROCEDURE — G8752 SYS BP LESS 140: HCPCS | Performed by: INTERNAL MEDICINE

## 2020-12-08 PROCEDURE — 99213 OFFICE O/P EST LOW 20 MIN: CPT | Performed by: INTERNAL MEDICINE

## 2020-12-08 PROCEDURE — G8427 DOCREV CUR MEDS BY ELIG CLIN: HCPCS | Performed by: INTERNAL MEDICINE

## 2020-12-08 PROCEDURE — G8536 NO DOC ELDER MAL SCRN: HCPCS | Performed by: INTERNAL MEDICINE

## 2020-12-08 PROCEDURE — 1101F PT FALLS ASSESS-DOCD LE1/YR: CPT | Performed by: INTERNAL MEDICINE

## 2020-12-08 PROCEDURE — 3017F COLORECTAL CA SCREEN DOC REV: CPT | Performed by: INTERNAL MEDICINE

## 2020-12-08 PROCEDURE — G0463 HOSPITAL OUTPT CLINIC VISIT: HCPCS | Performed by: INTERNAL MEDICINE

## 2020-12-08 RX ORDER — CYCLOBENZAPRINE HCL 10 MG
10 TABLET ORAL
Qty: 30 TAB | Refills: 0 | Status: SHIPPED | OUTPATIENT
Start: 2020-12-08

## 2020-12-08 NOTE — PROGRESS NOTES
This is the Subsequent Medicare Annual Wellness Exam, performed 12 months or more after the Initial AWV or the last Subsequent AWV    I have reviewed the patient's medical history in detail and updated the computerized patient record. Pulmonary fibrosis. Dx 2019. Saw Dr. Patricia Aleman. Started OFEV. Tolerating med with no side effects. His wife  of this disease. PFTs 20 pending. CT chest pending next month as well. Wt Readings from Last 3 Encounters:   20 225 lb 12.8 oz (102.4 kg)   20 228 lb 6.4 oz (103.6 kg)   19 232 lb (105.2 kg)     Hypertension  Tried telmisartan -2019 and chronic cough did not improve, so went back to lisinopril  Hypertension ROS: taking medications as instructed, no medication side effects noted, no TIA's, no chest pain on exertion, no dyspnea on exertion, no swelling of ankles     reports that he has never smoked. He has never used smokeless tobacco.    reports current alcohol use of about 4.2 standard drinks of alcohol per week. BP Readings from Last 2 Encounters:   20 136/67   20 130/66       History     Patient Active Problem List   Diagnosis Code    Benign essential HTN I10    Rosacea L71.9    Right hip pain M25.551    History of prostate cancer Z85.46    Cataract H26.9    Interstitial lung disease ([address] J84.9    Pulmonary fibrosis ([address] J84.10     Past Medical History:   Diagnosis Date    Basal cell carcinoma (BCC) of antitragus of right ear 2018    Benign essential HTN     Cataract 2018    Elevated hemidiaphragm     chronic, 1985. saw Dr. Joan Maldonado at [facility]    History of prostate cancer 2002    radical prostatectomy at .[facility] History of rubella     Pulmonary fibrosis ([facility] [address] 2019    Dr. Romero Patel Right hip pain     Dr. Marii Abreu, Dr. Raza Russell. severe OA. MRI.  injection helped    Rosacea     Umbilical hernia       Past Surgical History:   Procedure Laterality Date    HX CATARACT REMOVAL Left 11/29/2018    HX CATARACT REMOVAL Right 2019    Dr. Ramón Griffin HX COLONOSCOPY  02/25/2005    Dr. Severa Bering HX COLONOSCOPY  07/19/2017    Dr. Zeenat Arreaga.  tubular adenoma - repeat 5 years    HX HERNIA REPAIR  26/4391    umbilical, Dr. Bere Kemp Right 02/12/2018    Dr. Elma Styles      right ear lobe removal of cancer cells     H[place] PROST[place]CTOMY  5/2002    [place]     Current Outpatient Medications   Medication Sig Dispense Refill    lisinopriL (PRINIVIL, ZESTRIL) 20 mg tablet TAKE ONE TABLET BY MOUTH DAILY 90 Tab 3    nintedanib (Ofev) 150 mg cap Take  by mouth two (2) times a day.  benzonatate (TESSALON) 200 mg capsule Take 1 Cap by mouth three (3) times daily as needed for Cough. 60 Cap 3    folic acid/multivit-min/lutein (CENTRUM SILVER PO) Take 1 Tab by mouth. No Known Allergies    Family History   Problem Relation Age of Onset    Cancer Mother 76        breast.  d.at 80    Cancer Father 79        pulmonary carcinomatous    Heart Disease Brother         MI w stent    Hypertension Brother     Cancer Son         Hodgkins dz     Social History     Tobacco Use    Smoking status: Never Smoker    Smokeless tobacco: Never Used   Substance Use Topics    Alcohol use: Yes     Alcohol/week: 4.2 standard drinks     Types: 5 Glasses of wine per week     Comment: 4-5 drinks per week       Depression Risk Factor Screening:     3 most recent PHQ Screens 6/8/2020   PHQ Not Done -   Little interest or pleasure in doing things Not at all   Feeling down, depressed, irritable, or hopeless Not at all   Total Score PHQ 2 0       Alcohol Risk Factor Screening (MALE > 65): Do you average more 1 drink per night or more than 7 drinks a week: No    In the past three months have you have had more than 4 drinks containing alcohol on one occasion: No      Functional Ability and Level of Safety:   Hearing: Hearing is good. Activities of Daily Living:   The home contains: no safety equipment. Patient does total self care    Ambulation: with no difficulty    Fall Risk:  Fall Risk Assessment, last 12 mths 6/8/2020   Able to walk? Yes   Fall in past 12 months? No       Abuse Screen:  Patient is not abused    Cognitive Screening   Has your family/caregiver stated any concerns about your memory: no  Cognitive Screening: Normal - none    Patient Care Team   Patient Care Team:  Rachel Stuart MD as PCP - General (Internal Medicine)  Rachel Stuart MD as PCP - [facility]    Assessment/Plan   Education and counseling provided:  Are appropriate based on today's review and evaluation    Diagnoses and all orders for this visit:    1. Medicare annual wellness visit, subsequent  He is up-to-date on preventative services. 2. Pulmonary fibrosis ([address]  Remains relatively stable on current therapy. Has follow-up with pulmonary with CT scan pending. Tolerating Ofev. 3. Benign essential HTN  Controlled on current regimen. Continue current medications as written in chart.  -     CBC WITH AUTOMATED DIFF; Future  -     METABOLIC PANEL, COMPREHENSIVE; Future  -     LIPID PANEL; Future    4. History of prostate cancer  -     PSA W/ REFLX FREE PSA; Future    5. History of back pain  Currently asymptomatic  -     cyclobenzaprine (FLEXERIL) 10 mg tablet; Take 1 Tab by mouth three (3) times daily (with meals). Discussed the patient's BMI with him. The BMI follow up plan is as follows:     dietary management education, guidance, and counseling  encourage exercise  monitor weight  prescribed dietary intake    An After Visit Summary was printed and given to the patient.

## 2020-12-09 LAB
ALBUMIN SERPL-MCNC: 4 G/DL (ref 3.5–5)
ALBUMIN/GLOB SERPL: 1.3 {RATIO} (ref 1.1–2.2)
ALP SERPL-CCNC: 81 U/L (ref 45–117)
ALT SERPL-CCNC: 35 U/L (ref 12–78)
ANION GAP SERPL CALC-SCNC: 6 MMOL/L (ref 5–15)
AST SERPL-CCNC: 27 U/L (ref 15–37)
BASOPHILS # BLD: 0.1 K/UL (ref 0–0.1)
BASOPHILS NFR BLD: 1 % (ref 0–1)
BILIRUB SERPL-MCNC: 0.6 MG/DL (ref 0.2–1)
BUN SERPL-MCNC: 19 MG/DL (ref 6–20)
BUN/CREAT SERPL: 22 (ref 12–20)
CALCIUM SERPL-MCNC: 9.3 MG/DL (ref 8.5–10.1)
CHLORIDE SERPL-SCNC: 108 MMOL/L (ref 97–108)
CHOLEST SERPL-MCNC: 227 MG/DL
CO2 SERPL-SCNC: 29 MMOL/L (ref 21–32)
CREAT SERPL-MCNC: 0.85 MG/DL (ref 0.7–1.3)
DIFFERENTIAL METHOD BLD: NORMAL
EOSINOPHIL # BLD: 0.1 K/UL (ref 0–0.4)
EOSINOPHIL NFR BLD: 2 % (ref 0–7)
ERYTHROCYTE [DISTWIDTH] IN BLOOD BY AUTOMATED COUNT: 14.2 % (ref 11.5–14.5)
GLOBULIN SER CALC-MCNC: 3.2 G/DL (ref 2–4)
GLUCOSE SERPL-MCNC: 98 MG/DL (ref 65–100)
HCT VFR BLD AUTO: 45.9 % (ref 36.6–50.3)
HDLC SERPL-MCNC: 65 MG/DL
HDLC SERPL: 3.5 {RATIO} (ref 0–5)
HGB BLD-MCNC: 14.8 G/DL (ref 12.1–17)
IMM GRANULOCYTES # BLD AUTO: 0 K/UL (ref 0–0.04)
IMM GRANULOCYTES NFR BLD AUTO: 0 % (ref 0–0.5)
LDLC SERPL CALC-MCNC: 140.8 MG/DL (ref 0–100)
LIPID PROFILE,FLP: ABNORMAL
LYMPHOCYTES # BLD: 1.5 K/UL (ref 0.8–3.5)
LYMPHOCYTES NFR BLD: 24 % (ref 12–49)
MCH RBC QN AUTO: 30.6 PG (ref 26–34)
MCHC RBC AUTO-ENTMCNC: 32.2 G/DL (ref 30–36.5)
MCV RBC AUTO: 94.8 FL (ref 80–99)
MONOCYTES # BLD: 0.5 K/UL (ref 0–1)
MONOCYTES NFR BLD: 7 % (ref 5–13)
NEUTS SEG # BLD: 4.2 K/UL (ref 1.8–8)
NEUTS SEG NFR BLD: 66 % (ref 32–75)
NRBC # BLD: 0 K/UL (ref 0–0.01)
NRBC BLD-RTO: 0 PER 100 WBC
PLATELET # BLD AUTO: 167 K/UL (ref 150–400)
PMV BLD AUTO: 12.4 FL (ref 8.9–12.9)
POTASSIUM SERPL-SCNC: 4.9 MMOL/L (ref 3.5–5.1)
PROT SERPL-MCNC: 7.2 G/DL (ref 6.4–8.2)
PSA SERPL-MCNC: <0.1 NG/ML (ref 0–4)
RBC # BLD AUTO: 4.84 M/UL (ref 4.1–5.7)
REFLEX CRITERIA: NORMAL
SODIUM SERPL-SCNC: 143 MMOL/L (ref 136–145)
TRIGL SERPL-MCNC: 106 MG/DL (ref ?–150)
VLDLC SERPL CALC-MCNC: 21.2 MG/DL
WBC # BLD AUTO: 6.4 K/UL (ref 4.1–11.1)

## 2021-06-09 ENCOUNTER — OFFICE VISIT (OUTPATIENT)
Dept: INTERNAL MEDICINE CLINIC | Age: 76
End: 2021-06-09
Payer: MEDICARE

## 2021-06-09 VITALS
DIASTOLIC BLOOD PRESSURE: 79 MMHG | RESPIRATION RATE: 13 BRPM | HEIGHT: 72 IN | TEMPERATURE: 97.8 F | HEART RATE: 64 BPM | SYSTOLIC BLOOD PRESSURE: 122 MMHG | OXYGEN SATURATION: 97 % | BODY MASS INDEX: 30.34 KG/M2 | WEIGHT: 224 LBS

## 2021-06-09 DIAGNOSIS — J84.10 PULMONARY FIBROSIS (HCC): ICD-10-CM

## 2021-06-09 DIAGNOSIS — I10 BENIGN ESSENTIAL HTN: Primary | ICD-10-CM

## 2021-06-09 PROCEDURE — G8510 SCR DEP NEG, NO PLAN REQD: HCPCS | Performed by: INTERNAL MEDICINE

## 2021-06-09 PROCEDURE — G8536 NO DOC ELDER MAL SCRN: HCPCS | Performed by: INTERNAL MEDICINE

## 2021-06-09 PROCEDURE — 1101F PT FALLS ASSESS-DOCD LE1/YR: CPT | Performed by: INTERNAL MEDICINE

## 2021-06-09 PROCEDURE — G8754 DIAS BP LESS 90: HCPCS | Performed by: INTERNAL MEDICINE

## 2021-06-09 PROCEDURE — G8752 SYS BP LESS 140: HCPCS | Performed by: INTERNAL MEDICINE

## 2021-06-09 PROCEDURE — G8427 DOCREV CUR MEDS BY ELIG CLIN: HCPCS | Performed by: INTERNAL MEDICINE

## 2021-06-09 PROCEDURE — G8417 CALC BMI ABV UP PARAM F/U: HCPCS | Performed by: INTERNAL MEDICINE

## 2021-06-09 PROCEDURE — 99213 OFFICE O/P EST LOW 20 MIN: CPT | Performed by: INTERNAL MEDICINE

## 2021-06-09 PROCEDURE — G0463 HOSPITAL OUTPT CLINIC VISIT: HCPCS | Performed by: INTERNAL MEDICINE

## 2021-06-09 RX ORDER — ASPIRIN 325 MG
325 TABLET ORAL DAILY
Qty: 30 TABLET | Refills: 11
Start: 2021-06-09

## 2021-06-09 RX ORDER — LISINOPRIL 20 MG/1
TABLET ORAL
Qty: 90 TABLET | Refills: 3 | Status: SHIPPED | OUTPATIENT
Start: 2021-06-09 | End: 2022-08-05

## 2021-06-09 RX ORDER — GABAPENTIN 100 MG/1
600 CAPSULE ORAL
COMMUNITY
Start: 2021-03-15 | End: 2021-12-09

## 2021-06-09 NOTE — PROGRESS NOTES
HISTORY OF PRESENT ILLNESS    Chief Complaint   Patient presents with    Hypertension       Presents for follow-up    Hypertension  Hypertension ROS: taking medications as instructed, no medication side effects noted, no TIA's, no chest pain on exertion, no dyspnea on exertion, no swelling of ankles     reports that he has never smoked. He has never used smokeless tobacco.    reports current alcohol use of about 4.2 standard drinks of alcohol per week. BP Readings from Last 2 Encounters:   21 122/79   20 136/67     Was asked to start  mg due to coronary calcifications    Pulmonary fibrosis.    Reports s/s stable, mild  Dx 2019. Seeing Dr. Cesar Batista.  Tolerating med with no side effects. His wife  of this disease. PFTs 21 stable. CT chest 21 with \"no significant change\", but noted Pulm artery dilation. TTE echo done, but poor quality study, but no sig PHTN  Started gabapentin for cough and it helps per Dr. Sam Ramirez. Wt Readings from Last 3 Encounters:   21 224 lb (101.6 kg)   20 225 lb 12.8 oz (102.4 kg)   20 228 lb 6.4 oz (103.6 kg)         Review of Systems   All other systems reviewed and are negative, except as noted in HPI    Past Medical and Surgical History   has a past medical history of Basal cell carcinoma (BCC) of antitragus of right ear (2018), Benign essential HTN, Cataract (), Elevated hemidiaphragm, History of prostate cancer (2002), History of rubella, Pulmonary fibrosis ([facility] [address] (2019), Right hip pain, Rosacea, and Umbilical hernia (). has a past surgical history that includes hx prostatectomy (2002); hx colonoscopy (2005); hx colonoscopy (2017); hx hernia repair (2012); hx hip replacement (Right, 2018); hx other surgical; hx cataract removal (Left, 2018); and hx cataract removal (Right, ). reports that he has never smoked.  He has never used smokeless tobacco. He reports current alcohol use of about 4.2 standard drinks of alcohol per week. He reports that he does not use drugs. family history includes Cancer in his son; Cancer (age of onset: 79) in his father; Cancer (age of onset: 76) in his mother; Heart Disease in his brother; Hypertension in his brother. Physical Exam   Nursing note and vitals reviewed. Blood pressure 122/79, pulse 64, temperature 97.8 °F (36.6 °C), temperature source Oral, resp. rate 13, height 6' (1.829 m), weight 224 lb (101.6 kg), SpO2 97 %. Constitutional:  No distress. Eyes: Conjunctivae are normal.   Ears:  Hearing grossly intact  Cardiovascular: Normal rate. regular rhythm, no murmurs or gallops  No edema  Pulmonary/Chest: Effort normal.   CTAB  Musculoskeletal: moves all 4 extremities   Neurological: Alert and oriented to person, place, and time. Skin: No visible rash noted. Psychiatric: Normal mood and affect. Behavior is normal.     ASSESSMENT and PLAN  Diagnoses and all orders for this visit:    1. Benign essential HTN  This condition is controlled on current medication regimen as written in medication list.  Medications refilled. -     lisinopriL (PRINIVIL, ZESTRIL) 20 mg tablet; TAKE ONE TABLET BY MOUTH DAILY    2. Pulmonary fibrosis ([address]  Course he remains very stable. Under excellent care of pulmonary. It is reasonable to start aspirin for atherosclerotic disease as seen on recent studies. -     aspirin (ASPIRIN) 325 mg tablet; Take 1 Tablet by mouth daily. lab results and schedule of future lab studies reviewed with patient  reviewed medications and side effects in detail    Return to clinic for further evaluation if new symptoms develop        Current Outpatient Medications   Medication Sig    gabapentin (NEURONTIN) 100 mg capsule 100 mg = 1 CAP each dose, PO, tid, ICD 10 J84.10 Pulmonary Fibrosis. , # 270 CAP, 3 Refills, Pharmacy: [facility]    cyclobenzaprine (FLEXERIL) 10 mg tablet Take 1 Tab by mouth three (3) times daily (with meals).  lisinopriL (PRINIVIL, ZESTRIL) 20 mg tablet TAKE ONE TABLET BY MOUTH DAILY    nintedanib (Ofev) 150 mg cap Take  by mouth two (2) times a day.  benzonatate (TESSALON) 200 mg capsule Take 1 Cap by mouth three (3) times daily as needed for Cough.  folic acid/multivit-min/lutein (CENTRUM SILVER PO) Take 1 Tab by mouth. No current facility-administered medications for this visit.

## 2021-12-09 ENCOUNTER — OFFICE VISIT (OUTPATIENT)
Dept: INTERNAL MEDICINE CLINIC | Age: 76
End: 2021-12-09
Payer: MEDICARE

## 2021-12-09 VITALS
DIASTOLIC BLOOD PRESSURE: 79 MMHG | TEMPERATURE: 97.9 F | SYSTOLIC BLOOD PRESSURE: 133 MMHG | HEIGHT: 72 IN | RESPIRATION RATE: 14 BRPM | OXYGEN SATURATION: 98 % | WEIGHT: 225 LBS | BODY MASS INDEX: 30.48 KG/M2 | HEART RATE: 65 BPM

## 2021-12-09 DIAGNOSIS — I10 BENIGN ESSENTIAL HTN: ICD-10-CM

## 2021-12-09 DIAGNOSIS — Z00.00 MEDICARE ANNUAL WELLNESS VISIT, SUBSEQUENT: Primary | ICD-10-CM

## 2021-12-09 DIAGNOSIS — Z85.46 HISTORY OF PROSTATE CANCER: ICD-10-CM

## 2021-12-09 DIAGNOSIS — J84.10 PULMONARY FIBROSIS (HCC): ICD-10-CM

## 2021-12-09 LAB
ALBUMIN SERPL-MCNC: 3.6 G/DL (ref 3.5–5)
ALBUMIN/GLOB SERPL: 1.1 {RATIO} (ref 1.1–2.2)
ALP SERPL-CCNC: 67 U/L (ref 45–117)
ALT SERPL-CCNC: 44 U/L (ref 12–78)
ANION GAP SERPL CALC-SCNC: 5 MMOL/L (ref 5–15)
AST SERPL-CCNC: 26 U/L (ref 15–37)
BILIRUB SERPL-MCNC: 0.6 MG/DL (ref 0.2–1)
BUN SERPL-MCNC: 15 MG/DL (ref 6–20)
BUN/CREAT SERPL: 21 (ref 12–20)
CALCIUM SERPL-MCNC: 9.1 MG/DL (ref 8.5–10.1)
CHLORIDE SERPL-SCNC: 108 MMOL/L (ref 97–108)
CHOLEST SERPL-MCNC: 186 MG/DL
CO2 SERPL-SCNC: 28 MMOL/L (ref 21–32)
CREAT SERPL-MCNC: 0.71 MG/DL (ref 0.7–1.3)
ERYTHROCYTE [DISTWIDTH] IN BLOOD BY AUTOMATED COUNT: 14.7 % (ref 11.5–14.5)
GLOBULIN SER CALC-MCNC: 3.2 G/DL (ref 2–4)
GLUCOSE SERPL-MCNC: 95 MG/DL (ref 65–100)
HCT VFR BLD AUTO: 43.8 % (ref 36.6–50.3)
HDLC SERPL-MCNC: 59 MG/DL
HDLC SERPL: 3.2 {RATIO} (ref 0–5)
HGB BLD-MCNC: 14.2 G/DL (ref 12.1–17)
LDLC SERPL CALC-MCNC: 113.4 MG/DL (ref 0–100)
MCH RBC QN AUTO: 30.5 PG (ref 26–34)
MCHC RBC AUTO-ENTMCNC: 32.4 G/DL (ref 30–36.5)
MCV RBC AUTO: 94 FL (ref 80–99)
NRBC # BLD: 0 K/UL (ref 0–0.01)
NRBC BLD-RTO: 0 PER 100 WBC
PLATELET # BLD AUTO: 165 K/UL (ref 150–400)
PMV BLD AUTO: 11.8 FL (ref 8.9–12.9)
POTASSIUM SERPL-SCNC: 4.4 MMOL/L (ref 3.5–5.1)
PROT SERPL-MCNC: 6.8 G/DL (ref 6.4–8.2)
RBC # BLD AUTO: 4.66 M/UL (ref 4.1–5.7)
SODIUM SERPL-SCNC: 141 MMOL/L (ref 136–145)
TRIGL SERPL-MCNC: 68 MG/DL (ref ?–150)
VLDLC SERPL CALC-MCNC: 13.6 MG/DL
WBC # BLD AUTO: 6.4 K/UL (ref 4.1–11.1)

## 2021-12-09 PROCEDURE — G0463 HOSPITAL OUTPT CLINIC VISIT: HCPCS | Performed by: INTERNAL MEDICINE

## 2021-12-09 PROCEDURE — 1101F PT FALLS ASSESS-DOCD LE1/YR: CPT | Performed by: INTERNAL MEDICINE

## 2021-12-09 PROCEDURE — G8427 DOCREV CUR MEDS BY ELIG CLIN: HCPCS | Performed by: INTERNAL MEDICINE

## 2021-12-09 PROCEDURE — G8754 DIAS BP LESS 90: HCPCS | Performed by: INTERNAL MEDICINE

## 2021-12-09 PROCEDURE — G8510 SCR DEP NEG, NO PLAN REQD: HCPCS | Performed by: INTERNAL MEDICINE

## 2021-12-09 PROCEDURE — G8752 SYS BP LESS 140: HCPCS | Performed by: INTERNAL MEDICINE

## 2021-12-09 PROCEDURE — G0439 PPPS, SUBSEQ VISIT: HCPCS | Performed by: INTERNAL MEDICINE

## 2021-12-09 PROCEDURE — G8536 NO DOC ELDER MAL SCRN: HCPCS | Performed by: INTERNAL MEDICINE

## 2021-12-09 PROCEDURE — 99213 OFFICE O/P EST LOW 20 MIN: CPT | Performed by: INTERNAL MEDICINE

## 2021-12-09 PROCEDURE — G8417 CALC BMI ABV UP PARAM F/U: HCPCS | Performed by: INTERNAL MEDICINE

## 2021-12-09 RX ORDER — GABAPENTIN 100 MG/1
200 CAPSULE ORAL 3 TIMES DAILY
Qty: 360 CAPSULE | Refills: 1
Start: 2021-12-09 | End: 2022-12-04

## 2021-12-09 NOTE — PROGRESS NOTES
This is a Subsequent Medicare Annual Wellness Visit providing Personalized Prevention Plan Services (PPPS) (Performed 12 months after initial AWV and PPPS )    I have reviewed the patient's medical history in detail and updated the computerized patient record. Doing well. Pulmonary fibrosis  Seeing Dr. Michelle Dangelo. Tolerating OFEV. Denies side effects. Taking gabapentin for cough and is helps. Has mild MCARTHUR which is stable. Worsens with deep breaths. Hypertension  Hypertension ROS: taking medications as instructed, no medication side effects noted, no TIA's, no chest pain on exertion, no dyspnea on exertion, no swelling of ankles     reports that he has never smoked. He has never used smokeless tobacco.    reports current alcohol use of about 4.2 standard drinks of alcohol per week. BP Readings from Last 2 Encounters:   12/09/21 133/79   06/09/21 122/79       History     Past Medical History:   Diagnosis Date    Basal cell carcinoma (BCC) of antitragus of right ear 06/29/2018    Benign essential HTN     Cataract 2018    Elevated hemidiaphragm     chronic, 1985. saw [facility]. Bette Brown at [facility]    History of prostate cancer 05/2002    radical prostatectomy at [facility] History of rubella     Pulmonary fibrosis ([address] 12/2019    Dr. Betts Breath Right hip pain     Dr. Raissa Sumner, Dr. Maximo Barajas. severe OA. MRI.  injection helped    Rosacea     Umbilical hernia 1/17/4128       Past Surgical History:   Procedure Laterality Date    HX CATARACT REMOVAL Left 11/29/2018    HX CATARACT REMOVAL Right 2019    Dr. Tulio Russell HX COLONOSCOPY  02/25/2005    Dr. Richard Araiza HX COLONOSCOPY  07/19/2017    Dr. Charlene Ceron.  tubular adenoma - repeat 5 years    HX HERNIA REPAIR  93/4796    umbilical, Dr. Maria Guadalupe Matute Right 02/12/2018    Dr. Kamini Story      right ear lobe removal of cancer cells     HX PROSTATECTOMY  5/2002    [place]       Current Outpatient Medications   Medication Sig    gabapentin (NEURONTIN) 100 mg capsule Take 2 Capsules by mouth three (3) times daily for 360 days. Max Daily Amount: 600 mg. For cough, per Dr. Jeremiah Berumen aspirin (ASPIRIN) 325 mg tablet Take 1 Tablet by mouth daily.  lisinopriL (PRINIVIL, ZESTRIL) 20 mg tablet TAKE ONE TABLET BY MOUTH DAILY    cyclobenzaprine (FLEXERIL) 10 mg tablet Take 1 Tab by mouth three (3) times daily (with meals).  nintedanib (Ofev) 150 mg cap Take  by mouth two (2) times a day.  folic acid/multivit-min/lutein (CENTRUM SILVER PO) Take 1 Tab by mouth. No current facility-administered medications for this visit. No Known Allergies    Family History   Problem Relation Age of Onset    Cancer Mother 76        breast.  d.at 80    Cancer Father 79        pulmonary carcinomatous    Heart Disease Brother         MI w stent    Hypertension Brother     Cancer Son         Hodgkins dz        reports that he has never smoked. He has never used smokeless tobacco.   reports current alcohol use of about 4.2 standard drinks of alcohol per week. Depression Risk Factor Screening:       Alcohol Risk Factor Screening: On any occasion during the past 3 months, have you had more than 3 drinks containing alcohol? No    Do you average more than 14 drinks per week? No      Functional Ability and Level of Safety:     Hearing Loss   mild    Activities of Daily Living   Self-care. Requires assistance with: no ADLs    Fall Risk     Fall Risk Assessment, last 12 mths 6/9/2021   Able to walk? Yes   Fall in past 12 months? 0   Do you feel unsteady? 0   Are you worried about falling 0         Abuse Screen   Patient is not abused    Review of Systems   A comprehensive review of systems was negative except for that written in the HPI.     Physical Examination     Evaluation of Cognitive Function:  Mood/affect:  neutral, happy  Appearance: age appropriate  Family member/caregiver input: none    Blood pressure 133/79, pulse 65, temperature 97.9 °F (36.6 °C), temperature source Oral, resp. rate 14, height 6' (1.829 m), weight 225 lb (102.1 kg), SpO2 98 %. General appearance: alert, cooperative, no distress, appears stated age  Neck: supple, symmetrical, trachea midline, no adenopathy, thyroid: not enlarged, symmetric, no tenderness/mass/nodules, no carotid bruit and no JVD  Lungs: clear to auscultation bilaterally  Heart: regular rate and rhythm, S1, S2 normal, no murmur, click, rub or gallop  Extremities: extremities normal, atraumatic, no cyanosis or edema    Patient Care Team:  Charo Genao MD as PCP - General (Internal Medicine)  Charo Genao MD as PCP - [facility] Empaneled Provider      Advice/Referrals/Counseling   Education and counseling provided. See below for specific orders    Assessment/Plan   Diagnoses and all orders for this visit:    1. Medicare annual wellness visit, subsequent  He is up to date on preventative services    2. Benign essential HTN  This condition is controlled on current medication regimen as written in medication list.  Medications refilled. -     LIPID PANEL; Future  -     CBC W/O DIFF; Future  -     METABOLIC PANEL, COMPREHENSIVE; Future    3. Pulmonary fibrosis ([facility] [address]  This condition is controlled on current medication regimen as written in medication list.  Medications refilled. This condition appears to be stable and is being evaluated and managed by his specialist Dr. Sarah Reyes. No acute findings today warrant change in management plan. -     gabapentin (NEURONTIN) 100 mg capsule; Take 2 Capsules by mouth three (3) times daily for 360 days. Max Daily Amount: 600 mg. For cough, per Dr. Sarah Reyes    4. History of prostate cancer 2002  Further PSA testing is not medically needed, but he finds it reassuring.    -     PSA W/ REFLX FREE PSA; Future      Potential medication side effects were discussed with the patient; let me know if any occur.   Return for yearly Annual Wellness Visits

## 2021-12-10 LAB
PSA SERPL-MCNC: <0.1 NG/ML (ref 0–4)
REFLEX CRITERIA: NORMAL

## 2022-03-18 PROBLEM — H26.9 CATARACT: Status: ACTIVE | Noted: 2018-01-01

## 2022-03-18 PROBLEM — J84.9 INTERSTITIAL LUNG DISEASE (HCC): Status: ACTIVE | Noted: 2019-12-01

## 2022-03-19 PROBLEM — J84.10 PULMONARY FIBROSIS (HCC): Status: ACTIVE | Noted: 2019-12-01

## 2022-06-01 ENCOUNTER — OFFICE VISIT (OUTPATIENT)
Dept: INTERNAL MEDICINE CLINIC | Age: 77
End: 2022-06-01
Payer: MEDICARE

## 2022-06-01 VITALS
DIASTOLIC BLOOD PRESSURE: 74 MMHG | TEMPERATURE: 98.1 F | SYSTOLIC BLOOD PRESSURE: 118 MMHG | WEIGHT: 221.8 LBS | HEIGHT: 72 IN | OXYGEN SATURATION: 96 % | HEART RATE: 73 BPM | BODY MASS INDEX: 30.04 KG/M2 | RESPIRATION RATE: 15 BRPM

## 2022-06-01 DIAGNOSIS — J84.10 PULMONARY FIBROSIS (HCC): ICD-10-CM

## 2022-06-01 DIAGNOSIS — Z29.8 ALTITUDE SICKNESS PREVENTATIVE MEASURES: ICD-10-CM

## 2022-06-01 DIAGNOSIS — I10 BENIGN ESSENTIAL HTN: ICD-10-CM

## 2022-06-01 DIAGNOSIS — R05.9 COUGH: Primary | ICD-10-CM

## 2022-06-01 PROCEDURE — G8510 SCR DEP NEG, NO PLAN REQD: HCPCS | Performed by: INTERNAL MEDICINE

## 2022-06-01 PROCEDURE — G8754 DIAS BP LESS 90: HCPCS | Performed by: INTERNAL MEDICINE

## 2022-06-01 PROCEDURE — 1101F PT FALLS ASSESS-DOCD LE1/YR: CPT | Performed by: INTERNAL MEDICINE

## 2022-06-01 PROCEDURE — G8752 SYS BP LESS 140: HCPCS | Performed by: INTERNAL MEDICINE

## 2022-06-01 PROCEDURE — 99213 OFFICE O/P EST LOW 20 MIN: CPT | Performed by: INTERNAL MEDICINE

## 2022-06-01 PROCEDURE — G8427 DOCREV CUR MEDS BY ELIG CLIN: HCPCS | Performed by: INTERNAL MEDICINE

## 2022-06-01 PROCEDURE — G0463 HOSPITAL OUTPT CLINIC VISIT: HCPCS | Performed by: INTERNAL MEDICINE

## 2022-06-01 PROCEDURE — G8417 CALC BMI ABV UP PARAM F/U: HCPCS | Performed by: INTERNAL MEDICINE

## 2022-06-01 PROCEDURE — G8536 NO DOC ELDER MAL SCRN: HCPCS | Performed by: INTERNAL MEDICINE

## 2022-06-01 RX ORDER — ACETAZOLAMIDE 125 MG/1
TABLET ORAL
Qty: 10 TABLET | Refills: 0 | Status: SHIPPED | OUTPATIENT
Start: 2022-06-01

## 2022-06-01 NOTE — PROGRESS NOTES
HISTORY OF PRESENT ILLNESS    Chief Complaint   Patient presents with    Blood Pressure Check     6 mo f/u    Labs     Nonfasting.  Other     Discuss changes in oxygen level and med. Presents for follow-up     Was able to get a credit for his cruise and will re-schedule 12/2022. .     Will be going to 20 day trip to [facility] this month. 6/13/22 - 7/2/22. He is concerned about high altitude sickness in [address] [place] [address]. Has taken Diamox in the past.    Reports ongoing cough. Taking gabapentin w some relief. Hypertension   Hypertension ROS: taking medications as instructed, no medication side effects noted, no TIA's, no chest pain on exertion, no dyspnea on exertion, no swelling of ankles     reports that he has never smoked. He has never used smokeless tobacco.    reports current alcohol use of about 4.2 standard drinks of alcohol per week. BP Readings from Last 2 Encounters:   06/01/22 118/74   12/09/21 133/79     Pulmonary fibrosis. Saw Dr. Nataliia Mueller for f/u 3/23/22. Taking OFEV. Per his note, \"The accumulating data over a period of approximately 2-1/2 years on the drug nintedanib is that there is a slow decline in lung volumes as measured by forced vital capacity and total lung capacity measurements. At the present time there is no indication for any oxygen therapy given the fact that patient is relatively robust exercise tolerance with in clinic exercise testing showing no significant desaturation. It will be important beginning with the next pulmonary function studies to obtain 6-minute walk distance as well as updating complete pulmonary function testing. At the present time the cough is being controlled by gabapentin at 200 mg 3 times daily. Will speak to the patient concerning a possible switch to another antihypertensive other than lisinopril which is known also to promote cough. \"  Plan to repeat PFTs 9/21/22 w 6 min walk.       Review of Systems   All other systems reviewed and are negative, except as noted in HPI    Past Medical and Surgical History   has a past medical history of Basal cell carcinoma (BCC) of antitragus of right ear (06/29/2018), Benign essential HTN, Cataract (2018), Elevated hemidiaphragm, History of prostate cancer (05/2002), History of rubella, Pulmonary fibrosis ([facility] [address] (12/2019), Right hip pain, Rosacea, and Umbilical hernia (0/68/3304). has a past surgical history that includes hx prostatectomy (5/2002); hx colonoscopy (02/25/2005); hx colonoscopy (07/19/2017); hx hernia repair (05/2012); hx hip replacement (Right, 02/12/2018); hx other surgical; hx cataract removal (Left, 11/29/2018); and hx cataract removal (Right, 2019). reports that he has never smoked. He has never used smokeless tobacco. He reports current alcohol use of about 4.2 standard drinks of alcohol per week. He reports that he does not use drugs. family history includes Cancer in his son; Cancer (age of onset: 79) in his father; Cancer (age of onset: 76) in his mother; Heart Disease in his brother; Hypertension in his brother. Physical Exam   Nursing note and vitals reviewed. Blood pressure 118/74, pulse 73, temperature 98.1 °F (36.7 °C), temperature source Oral, resp. rate 15, height 6' (1.829 m), weight 221 lb 12.8 oz (100.6 kg), SpO2 96 %. Constitutional:  No distress. Eyes: Conjunctivae are normal.   Ears:  Hearing grossly intact  Cardiovascular: Normal rate. regular rhythm, no murmurs or gallops  No edema  Pulmonary/Chest: Effort normal.   Crackles in bilateral bases but good air movement bilaterally  Musculoskeletal: moves all 4 extremities   Neurological: Alert and oriented to person, place, and time. Skin: No visible rash noted. Psychiatric: Normal mood and affect. Behavior is normal.     Assessment and Plan    Diagnoses and all orders for this visit:    1. Cough  Primarily due to pulmonary fibrosis.   Discussed stopping his ACE inhibitor since it certainly can increase cough but he opposed since we tried this before and it did not seem to help much. Alternative medication such as losartan would be very well-tolerated and I still think would be a good idea to eliminate the possibility of an ACE related cough contributing. .    2. Benign essential HTN  This condition is controlled on current medication regimen as written in medication list.  Medications refilled. 3. Pulmonary fibrosis ([facility] [address]  This condition appears to be stable and is being evaluated and managed by his specialist Dr. Maci Newberry. No acute findings today warrant change in management plan. 4. Altitude sickness preventative measures  [place] part [place] 1 [place]. Start medication the day prior to ascent and can stop it the day after the trip  -     acetaZOLAMIDE (DIAMOX) 125 mg tablet; 125 mg twice daily; may be discontinued after staying at the same elevation for 2 to 4 days or if descent is initiated        lab results and schedule of future lab studies reviewed with patient  reviewed medications and side effects in detail    Return to clinic for further evaluation if new symptoms develop        Current Outpatient Medications   Medication Sig    gabapentin (NEURONTIN) 100 mg capsule Take 2 Capsules by mouth three (3) times daily for 360 days. Max Daily Amount: 600 mg. For cough, per Dr. Arlene Mora aspirin (ASPIRIN) 325 mg tablet Take 1 Tablet by mouth daily.  lisinopriL (PRINIVIL, ZESTRIL) 20 mg tablet TAKE ONE TABLET BY MOUTH DAILY    cyclobenzaprine (FLEXERIL) 10 mg tablet Take 1 Tab by mouth three (3) times daily (with meals).  nintedanib (Ofev) 150 mg cap Take  by mouth two (2) times a day.  folic acid/multivit-min/lutein (CENTRUM SILVER PO) Take 1 Tab by mouth. No current facility-administered medications for this visit.

## 2022-08-05 DIAGNOSIS — I10 BENIGN ESSENTIAL HTN: ICD-10-CM

## 2022-08-05 RX ORDER — LISINOPRIL 20 MG/1
TABLET ORAL
Qty: 90 TABLET | Refills: 3 | Status: SHIPPED | OUTPATIENT
Start: 2022-08-05

## 2022-12-19 ENCOUNTER — OFFICE VISIT (OUTPATIENT)
Dept: INTERNAL MEDICINE CLINIC | Age: 77
End: 2022-12-19
Payer: MEDICARE

## 2022-12-19 VITALS
HEIGHT: 72 IN | HEART RATE: 67 BPM | TEMPERATURE: 97 F | BODY MASS INDEX: 30.48 KG/M2 | RESPIRATION RATE: 18 BRPM | OXYGEN SATURATION: 98 % | DIASTOLIC BLOOD PRESSURE: 70 MMHG | WEIGHT: 225 LBS | SYSTOLIC BLOOD PRESSURE: 134 MMHG

## 2022-12-19 DIAGNOSIS — I10 BENIGN ESSENTIAL HTN: ICD-10-CM

## 2022-12-19 DIAGNOSIS — Z12.5 SCREENING FOR PROSTATE CANCER: ICD-10-CM

## 2022-12-19 DIAGNOSIS — Z00.00 MEDICARE ANNUAL WELLNESS VISIT, SUBSEQUENT: Primary | ICD-10-CM

## 2022-12-19 DIAGNOSIS — J84.10 PULMONARY FIBROSIS (HCC): ICD-10-CM

## 2022-12-19 DIAGNOSIS — Z85.46 HISTORY OF PROSTATE CANCER: ICD-10-CM

## 2022-12-19 DIAGNOSIS — Z86.010 HISTORY OF COLON POLYPS: ICD-10-CM

## 2022-12-19 LAB
ALBUMIN SERPL-MCNC: 3.8 G/DL (ref 3.5–5)
ALBUMIN/GLOB SERPL: 1.3 {RATIO} (ref 1.1–2.2)
ALP SERPL-CCNC: 69 U/L (ref 45–117)
ALT SERPL-CCNC: 29 U/L (ref 12–78)
ANION GAP SERPL CALC-SCNC: 4 MMOL/L (ref 5–15)
AST SERPL-CCNC: 28 U/L (ref 15–37)
BASOPHILS # BLD: 0.1 K/UL (ref 0–0.1)
BASOPHILS NFR BLD: 1 % (ref 0–1)
BILIRUB SERPL-MCNC: 0.4 MG/DL (ref 0.2–1)
BUN SERPL-MCNC: 20 MG/DL (ref 6–20)
BUN/CREAT SERPL: 26 (ref 12–20)
CALCIUM SERPL-MCNC: 8.8 MG/DL (ref 8.5–10.1)
CHLORIDE SERPL-SCNC: 108 MMOL/L (ref 97–108)
CHOLEST SERPL-MCNC: 186 MG/DL
CO2 SERPL-SCNC: 28 MMOL/L (ref 21–32)
CREAT SERPL-MCNC: 0.76 MG/DL (ref 0.7–1.3)
DIFFERENTIAL METHOD BLD: NORMAL
EOSINOPHIL # BLD: 0.4 K/UL (ref 0–0.4)
EOSINOPHIL NFR BLD: 7 % (ref 0–7)
ERYTHROCYTE [DISTWIDTH] IN BLOOD BY AUTOMATED COUNT: 14.5 % (ref 11.5–14.5)
GLOBULIN SER CALC-MCNC: 2.9 G/DL (ref 2–4)
GLUCOSE SERPL-MCNC: 104 MG/DL (ref 65–100)
HCT VFR BLD AUTO: 44.5 % (ref 36.6–50.3)
HDLC SERPL-MCNC: 62 MG/DL
HDLC SERPL: 3 {RATIO} (ref 0–5)
HGB BLD-MCNC: 14.2 G/DL (ref 12.1–17)
IMM GRANULOCYTES # BLD AUTO: 0 K/UL (ref 0–0.04)
IMM GRANULOCYTES NFR BLD AUTO: 0 % (ref 0–0.5)
LDLC SERPL CALC-MCNC: 108.2 MG/DL (ref 0–100)
LYMPHOCYTES # BLD: 1.3 K/UL (ref 0.8–3.5)
LYMPHOCYTES NFR BLD: 26 % (ref 12–49)
MCH RBC QN AUTO: 30.9 PG (ref 26–34)
MCHC RBC AUTO-ENTMCNC: 31.9 G/DL (ref 30–36.5)
MCV RBC AUTO: 96.7 FL (ref 80–99)
MONOCYTES # BLD: 0.4 K/UL (ref 0–1)
MONOCYTES NFR BLD: 9 % (ref 5–13)
NEUTS SEG # BLD: 2.9 K/UL (ref 1.8–8)
NEUTS SEG NFR BLD: 57 % (ref 32–75)
NRBC # BLD: 0 K/UL (ref 0–0.01)
NRBC BLD-RTO: 0 PER 100 WBC
PLATELET # BLD AUTO: 176 K/UL (ref 150–400)
PMV BLD AUTO: 11.7 FL (ref 8.9–12.9)
POTASSIUM SERPL-SCNC: 4.4 MMOL/L (ref 3.5–5.1)
PROT SERPL-MCNC: 6.7 G/DL (ref 6.4–8.2)
PSA SERPL-MCNC: 0 NG/ML (ref 0.01–4)
RBC # BLD AUTO: 4.6 M/UL (ref 4.1–5.7)
SODIUM SERPL-SCNC: 140 MMOL/L (ref 136–145)
TRIGL SERPL-MCNC: 79 MG/DL (ref ?–150)
VLDLC SERPL CALC-MCNC: 15.8 MG/DL
WBC # BLD AUTO: 5.1 K/UL (ref 4.1–11.1)

## 2022-12-19 PROCEDURE — G8510 SCR DEP NEG, NO PLAN REQD: HCPCS | Performed by: INTERNAL MEDICINE

## 2022-12-19 PROCEDURE — G8427 DOCREV CUR MEDS BY ELIG CLIN: HCPCS | Performed by: INTERNAL MEDICINE

## 2022-12-19 PROCEDURE — G8754 DIAS BP LESS 90: HCPCS | Performed by: INTERNAL MEDICINE

## 2022-12-19 PROCEDURE — G8417 CALC BMI ABV UP PARAM F/U: HCPCS | Performed by: INTERNAL MEDICINE

## 2022-12-19 PROCEDURE — 1101F PT FALLS ASSESS-DOCD LE1/YR: CPT | Performed by: INTERNAL MEDICINE

## 2022-12-19 PROCEDURE — G0439 PPPS, SUBSEQ VISIT: HCPCS | Performed by: INTERNAL MEDICINE

## 2022-12-19 PROCEDURE — G8536 NO DOC ELDER MAL SCRN: HCPCS | Performed by: INTERNAL MEDICINE

## 2022-12-19 PROCEDURE — G8752 SYS BP LESS 140: HCPCS | Performed by: INTERNAL MEDICINE

## 2022-12-19 RX ORDER — GABAPENTIN 300 MG/1
300 CAPSULE ORAL 2 TIMES DAILY
COMMUNITY
Start: 2022-09-23 | End: 2023-09-23

## 2022-12-19 NOTE — PROGRESS NOTES
This is a Subsequent Medicare Annual Wellness Visit providing Personalized Prevention Plan Services (PPPS) (Performed 12 months after initial AWV and PPPS )    I have reviewed the patient's medical history in detail and updated the computerized patient record. Doing well. Traveled to [facility]. Returned from [place] cruise    Hx colon polyp 7/19/17 Dr. Demetrius Carnes. Tubular adenoma - recommended repeat 5 years    Pulmonary fibrosis  Seeing Dr. Anthony Hand at [facility]. Tolerating OFEV. PFTs 9/21/22. Since 7/14/2021 there has been a decline of 210 mL and the vital capacity. However, there has been a 520 mL increase in the total lung capacity. Denies side effects. Taking gabapentin for cough. Has mild MCARTHUR which is stable. Hypertension  Hypertension ROS: taking medications as instructed, no medication side effects noted, no TIA's, no chest pain on exertion, no dyspnea on exertion, no swelling of ankles     reports that he has never smoked. He has never used smokeless tobacco.    reports current alcohol use of about 4.2 standard drinks per week. BP Readings from Last 2 Encounters:   12/19/22 134/70   06/01/22 118/74       History     Past Medical History:   Diagnosis Date    Basal cell carcinoma (BCC) of antitragus of right ear 06/29/2018    Benign essential HTN     Cataract 2018    Elevated hemidiaphragm     chronic, 1985. saw Dr. Ginger Calixto at [facility]    History of prostate cancer 05/2002    radical prostatectomy at [address]    History of rubella     Pulmonary fibrosis ([facility] [address] 12/2019    Dr. Anthony Hand    Right hip pain     Dr. Dede Mckeon, Dr. Amara Napoles. severe OA. MRI.  injection helped    Rosacea     Umbilical hernia 5/44/7728       Past Surgical History:   Procedure Laterality Date    HX CATARACT REMOVAL Left 11/29/2018    HX CATARACT REMOVAL Right 2019    Dr. Toño Gardiner COLONOSCOPY  02/25/2005    Dr. Ada Atkins COLONOSCOPY  07/19/2017    Dr. Demetrius Carnes.  tubular adenoma - repeat 5 years    HX HERNIA REPAIR  05/2012 umbilical, Dr. Thom Duff Right 02/12/2018    Dr. Lucila Kelly      right ear lobe removal of cancer cells     HX PROSTATECTOMY  5/2002    [place]       Current Outpatient Medications   Medication Sig    gabapentin (NEURONTIN) 300 mg capsule Take 300 mg by mouth two (2) times a day. lisinopriL (PRINIVIL, ZESTRIL) 20 mg tablet TAKE ONE TABLET BY MOUTH DAILY    aspirin (ASPIRIN) 325 mg tablet Take 1 Tablet by mouth daily. cyclobenzaprine (FLEXERIL) 10 mg tablet Take 1 Tab by mouth three (3) times daily (with meals). nintedanib (Ofev) 150 mg cap Take  by mouth two (2) times a day. folic acid/multivit-min/lutein (CENTRUM SILVER PO) Take 1 Tab by mouth. No current facility-administered medications for this visit. No Known Allergies    Family History   Problem Relation Age of Onset    Cancer Mother 76        breast.  d.at 80    Cancer Father 79        pulmonary carcinomatous    Heart Disease Brother         MI w stent    Hypertension Brother     Cancer Son         Hodgkins dz        reports that he has never smoked. He has never used smokeless tobacco.   reports current alcohol use of about 4.2 standard drinks per week. Depression Risk Factor Screening:       Alcohol Risk Factor Screening: On any occasion during the past 3 months, have you had more than 3 drinks containing alcohol? No    Do you average more than 14 drinks per week? No      Functional Ability and Level of Safety:     Hearing Loss   mild    Activities of Daily Living   Self-care. Requires assistance with: no ADLs    Fall Risk     Fall Risk Assessment, last 12 mths 12/19/2022   Able to walk? Yes   Fall in past 12 months? 0   Do you feel unsteady? 0   Are you worried about falling 0         Abuse Screen   Patient is not abused    Review of Systems   A comprehensive review of systems was negative except for that written in the HPI.     Physical Examination     Evaluation of Cognitive Function:  Mood/affect:  neutral, happy  Appearance: age appropriate  Family member/caregiver input: none    Blood pressure 134/70, pulse 67, temperature 97 °F (36.1 °C), temperature source Oral, resp. rate 18, height 6' (1.829 m), weight 225 lb (102.1 kg), SpO2 98 %. General appearance: alert, cooperative, no distress, appears stated age  Neck: supple, symmetrical, trachea midline, no adenopathy, thyroid: not enlarged, symmetric, no tenderness/mass/nodules, no carotid bruit and no JVD  Lungs: clear to auscultation bilaterally  Heart: regular rate and rhythm, S1, S2 normal, no murmur, click, rub or gallop  Extremities: extremities normal, atraumatic, no cyanosis or edema    Patient Care Team:  Emily Pennington MD as PCP - General (Internal Medicine Physician)  Emily Pennington MD as PCP - [facility] Empaneled Provider      Advice/Referrals/Counseling   Education and counseling provided. See below for specific orders    Assessment/Plan   Diagnoses and all orders for this visit:    1. Medicare annual wellness visit, subsequent  ACP reviewed. Primary medical decision maker reviewed with patient and updated in chart. Colonoscopy due (5 years) as of 7/2022. He will contact Dr. Francine Knapp. 2. Benign essential HTN  This condition is controlled on current medication regimen as written in medication list.  Medications refilled. -     LIPID PANEL; Future  -     CBC W/O DIFF; Future  -     METABOLIC PANEL, COMPREHENSIVE; Future    3. Pulmonary fibrosis ([facility] [address]  This condition appears to be stable and is being evaluated and managed by his specialist Dr. Corinna Vazquez. No acute findings today warrant change in management plan. Cont OFEV, gabapentin  -     gabapentin (NEURONTIN) 300 mg capsule; Take 1 Capsules BID for cough, per Dr. Corinna Vazquez    4.  History of prostate cancer 2002  Further PSA testing is not medically needed, but he finds it reassuring.    -     PSA Screening      Potential medication side effects were discussed with the patient; let me know if any occur.   Return for yearly Annual Wellness Visits

## 2022-12-19 NOTE — PROGRESS NOTES
Room:  Identified pt with two pt identifiers(name and ). Reviewed record in preparation for visit and have obtained necessary documentation. Chief Complaint   Patient presents with    Annual Wellness Visit          Vitals:    22 0755   BP: 134/70   Pulse: 67   Resp: 18   Temp: 97 °F (36.1 °C)   TempSrc: Oral   SpO2: 98%   Weight: 225 lb (102.1 kg)   Height: 6' (1.829 m)   PainSc:   0 - No pain       Health Maintenance Due   Topic    COVID-19 Vaccine (4 - Booster for [employer] series)    Flu Vaccine (1)    Medicare Yearly Exam        1. \"Have you been to the ER, urgent care clinic since your last visit? Hospitalized since your last visit? \" No    2. \"Have you seen or consulted any other health care providers outside of the [address] [place] [address] since your last visit? \" No     3. For patients over 45: Has the patient had a colonoscopy? NA - based on age     If the patient is female:    4. For patients over 36: Has the patient had a mammogram? NA - based on age    11. For patients over 21: Has the patient had a pap smear?  NA - based on age    Current Outpatient Medications   Medication Instructions    acetaZOLAMIDE (DIAMOX) 125 mg tablet 125 mg twice daily; may be discontinued after staying at the same elevation for 2 to 4 days or if descent is initiated    aspirin (ASPIRIN) 325 mg, Oral, DAILY    cyclobenzaprine (FLEXERIL) 10 mg, Oral, 3 TIMES DAILY WITH MEALS    folic acid/multivit-min/lutein (CENTRUM SILVER PO) 1 Tablet, Oral    gabapentin (NEURONTIN) 300 mg, Oral, 2 TIMES DAILY    lisinopriL (PRINIVIL, ZESTRIL) 20 mg tablet TAKE ONE TABLET BY MOUTH DAILY    nintedanib (Ofev) 150 mg cap Oral, 2 TIMES DAILY       No Known Allergies    Immunization History   Administered Date(s) Administered    COVID-19, PFIZER PURPLE top, DILUTE for use, (age 15 y+), IM, 30mcg/0.3mL 2021, 2021, 2021    Influenza High Dose Vaccine PF 2016, 10/19/2018, 10/15/2019, 10/21/2021    Influenza Vaccine 10/01/2015, 10/10/2017, 10/05/2020, 10/21/2021    Influenza Vaccine Split 10/07/2011, 10/12/2012    Influenza, FLUAD, (age 72 y+), Adjuvanted 10/05/2020    Influenza, FLUCELVAX, (age 10 mo+), MDCK, PF 10/15/2019    Pneumococcal Conjugate (PCV-13) 12/01/2015    Pneumococcal Polysaccharide (PPSV-23) 12/04/2017    Pneumococcal Vaccine (Unspecified Type) 12/01/2015    TB Skin Test (PPD) 01/01/2008    Zoster Recombinant 07/10/2018, 01/03/2019, 01/31/2019, 01/31/2019    Zoster Vaccine, Live 10/03/2019, 07/10/2021    Zoster Vaccine, Unspecified Formulation 01/03/2019       Past Medical History:   Diagnosis Date    Basal cell carcinoma (BCC) of antitragus of right ear 06/29/2018    Benign essential HTN     Cataract 2018    Elevated hemidiaphragm     chronic, 1985. saw Dr. Walter Rivers at [facility]    History of prostate cancer 05/2002    rad[facility] prostate[facility] at [facility]    History of rubella     Pulmonary fibrosis ([facility] [address] 12/2019    Dr. Roverto Uribe    Right hip pain     Dr. Jasmeet Raines, Dr. Karen Bernard. severe OA. MRI.  injection helped    Rosacea     Umbilical hernia 1/64/2373

## 2023-06-20 ENCOUNTER — OFFICE VISIT (OUTPATIENT)
Age: 78
End: 2023-06-20
Payer: MEDICARE

## 2023-06-20 VITALS
HEART RATE: 67 BPM | OXYGEN SATURATION: 94 % | RESPIRATION RATE: 17 BRPM | TEMPERATURE: 97.7 F | HEIGHT: 72 IN | DIASTOLIC BLOOD PRESSURE: 84 MMHG | WEIGHT: 223 LBS | SYSTOLIC BLOOD PRESSURE: 128 MMHG | BODY MASS INDEX: 30.2 KG/M2

## 2023-06-20 DIAGNOSIS — I10 BENIGN ESSENTIAL HTN: ICD-10-CM

## 2023-06-20 DIAGNOSIS — J84.10 PULMONARY FIBROSIS (HCC): Primary | ICD-10-CM

## 2023-06-20 PROCEDURE — G8428 CUR MEDS NOT DOCUMENT: HCPCS | Performed by: INTERNAL MEDICINE

## 2023-06-20 PROCEDURE — G8417 CALC BMI ABV UP PARAM F/U: HCPCS | Performed by: INTERNAL MEDICINE

## 2023-06-20 PROCEDURE — 3074F SYST BP LT 130 MM HG: CPT | Performed by: INTERNAL MEDICINE

## 2023-06-20 PROCEDURE — 1123F ACP DISCUSS/DSCN MKR DOCD: CPT | Performed by: INTERNAL MEDICINE

## 2023-06-20 PROCEDURE — 3079F DIAST BP 80-89 MM HG: CPT | Performed by: INTERNAL MEDICINE

## 2023-06-20 PROCEDURE — 99213 OFFICE O/P EST LOW 20 MIN: CPT | Performed by: INTERNAL MEDICINE

## 2023-06-20 PROCEDURE — 1036F TOBACCO NON-USER: CPT | Performed by: INTERNAL MEDICINE

## 2023-06-20 RX ORDER — HYDROCORTISONE VALERATE 2 MG/G
OINTMENT TOPICAL
COMMUNITY
Start: 2023-04-08 | End: 2023-06-20

## 2023-06-20 RX ORDER — HYDROCORTISONE VALERATE 2 MG/G
OINTMENT TOPICAL
Qty: 15 G | Refills: 5
Start: 2023-06-20

## 2023-06-20 SDOH — ECONOMIC STABILITY: INCOME INSECURITY: HOW HARD IS IT FOR YOU TO PAY FOR THE VERY BASICS LIKE FOOD, HOUSING, MEDICAL CARE, AND HEATING?: NOT HARD AT ALL

## 2023-06-20 SDOH — ECONOMIC STABILITY: FOOD INSECURITY: WITHIN THE PAST 12 MONTHS, THE FOOD YOU BOUGHT JUST DIDN'T LAST AND YOU DIDN'T HAVE MONEY TO GET MORE.: NEVER TRUE

## 2023-06-20 SDOH — ECONOMIC STABILITY: HOUSING INSECURITY
IN THE LAST 12 MONTHS, WAS THERE A TIME WHEN YOU DID NOT HAVE A STEADY PLACE TO SLEEP OR SLEPT IN A SHELTER (INCLUDING NOW)?: NO

## 2023-06-20 SDOH — ECONOMIC STABILITY: FOOD INSECURITY: WITHIN THE PAST 12 MONTHS, YOU WORRIED THAT YOUR FOOD WOULD RUN OUT BEFORE YOU GOT MONEY TO BUY MORE.: NEVER TRUE

## 2023-06-20 ASSESSMENT — PATIENT HEALTH QUESTIONNAIRE - PHQ9
SUM OF ALL RESPONSES TO PHQ QUESTIONS 1-9: 0
SUM OF ALL RESPONSES TO PHQ QUESTIONS 1-9: 0
2. FEELING DOWN, DEPRESSED OR HOPELESS: 0
SUM OF ALL RESPONSES TO PHQ9 QUESTIONS 1 & 2: 0
1. LITTLE INTEREST OR PLEASURE IN DOING THINGS: 0
SUM OF ALL RESPONSES TO PHQ QUESTIONS 1-9: 0
SUM OF ALL RESPONSES TO PHQ QUESTIONS 1-9: 0

## 2023-06-20 NOTE — PROGRESS NOTES
HISTORY OF PRESENT ILLNESS    Chief Complaint   Patient presents with    Blood Pressure Check     6 mo f/up    Lab Collection     Fasting. Presents for follow-up    He is overall doing fairly well. Not traveling as much this year. Pulmonary fibrosis  Seeing Dr. Rhonda Matthews. Symptomatically fairly stable. Taking OFEV. Colonoscopy 2/9/23 showed. no polyps. Dr. Tawanna Rudd at [facility]. Hypertension  Hypertension ROS: taking medications as instructed, no medication side effects noted, no TIA's, no chest pain on exertion, no dyspnea on exertion, no swelling of ankles     reports that he has never smoked. He has never used smokeless tobacco.    reports current alcohol use of about 4.2 standard drinks per week. BP Readings from Last 2 Encounters:   06/20/23 128/84   12/19/22 134/70         Review of Systems   All other systems reviewed and are negative, except as noted in HPI    Past Medical and Surgical History   has a past medical history of Basal cell carcinoma (BCC) of antitragus of right ear, Benign essential HTN, Cataract, Elevated hemidiaphragm, History of prostate cancer, History of rubella, Pulmonary fibrosis ([address] Right hip pain, Rosacea, and Umbilical hernia. has a past surgical history that includes Prostatectomy (5/2002); Cataract removal (Right, 2019); Cataract removal (Left, 11/29/2018); other surgical history; Total hip arthroplasty (Right, 02/12/2018); hernia repair (05/2012); Colonoscopy (07/19/2017); Colonoscopy (02/25/2005); and Colonoscopy (02/09/2023). reports that he has never smoked. He has never used smokeless tobacco. He reports current alcohol use of about 4.2 standard drinks per week. He reports that he does not use drugs. family history includes Cancer in his son; Cancer (age of onset: 79) in his father; Cancer (age of onset: 76) in his mother; Heart Disease in his brother; Hypertension in his brother. Physical Exam   Nursing note and vitals reviewed.   Blood pressure

## 2023-07-12 ENCOUNTER — TELEPHONE (OUTPATIENT)
Age: 78
End: 2023-07-12

## 2023-07-12 NOTE — TELEPHONE ENCOUNTER
Per PCP, medical record request sent via [id number] Routing Function to Medical Records for Dr. Ayah Otoole Office for a copy of patient's most recent colonoscopy report & any applicable pathology report.   [facility] Electronic Routing Function fax results report shows a successful transmission of the medical record request.

## 2023-07-25 DIAGNOSIS — I10 BENIGN ESSENTIAL HTN: Primary | ICD-10-CM

## 2023-07-26 RX ORDER — LISINOPRIL 20 MG/1
TABLET ORAL
Qty: 90 TABLET | Refills: 3 | Status: SHIPPED | OUTPATIENT
Start: 2023-07-26

## 2023-11-12 SDOH — HEALTH STABILITY: PHYSICAL HEALTH
ON AVERAGE, HOW MANY DAYS PER WEEK DO YOU ENGAGE IN MODERATE TO STRENUOUS EXERCISE (LIKE A BRISK WALK)?: PATIENT DECLINED

## 2023-11-12 ASSESSMENT — PATIENT HEALTH QUESTIONNAIRE - PHQ9
SUM OF ALL RESPONSES TO PHQ QUESTIONS 1-9: 0
SUM OF ALL RESPONSES TO PHQ QUESTIONS 1-9: 0
SUM OF ALL RESPONSES TO PHQ9 QUESTIONS 1 & 2: 0
SUM OF ALL RESPONSES TO PHQ QUESTIONS 1-9: 0
2. FEELING DOWN, DEPRESSED OR HOPELESS: 0
SUM OF ALL RESPONSES TO PHQ QUESTIONS 1-9: 0
1. LITTLE INTEREST OR PLEASURE IN DOING THINGS: 0

## 2023-11-12 ASSESSMENT — LIFESTYLE VARIABLES
HOW OFTEN DO YOU HAVE A DRINK CONTAINING ALCOHOL: 4
HOW OFTEN DO YOU HAVE A DRINK CONTAINING ALCOHOL: 2-3 TIMES A WEEK
HOW OFTEN DO YOU HAVE SIX OR MORE DRINKS ON ONE OCCASION: 1
HOW MANY STANDARD DRINKS CONTAINING ALCOHOL DO YOU HAVE ON A TYPICAL DAY: 1
HOW MANY STANDARD DRINKS CONTAINING ALCOHOL DO YOU HAVE ON A TYPICAL DAY: 1 OR 2

## 2023-11-15 ENCOUNTER — OFFICE VISIT (OUTPATIENT)
Age: 78
End: 2023-11-15
Payer: MEDICARE

## 2023-11-15 VITALS
HEART RATE: 62 BPM | HEIGHT: 72 IN | OXYGEN SATURATION: 95 % | WEIGHT: 218.4 LBS | RESPIRATION RATE: 17 BRPM | BODY MASS INDEX: 29.58 KG/M2 | SYSTOLIC BLOOD PRESSURE: 132 MMHG | DIASTOLIC BLOOD PRESSURE: 83 MMHG | TEMPERATURE: 97.7 F

## 2023-11-15 DIAGNOSIS — Z12.5 SCREENING FOR PROSTATE CANCER: ICD-10-CM

## 2023-11-15 DIAGNOSIS — J84.10 PULMONARY FIBROSIS (HCC): ICD-10-CM

## 2023-11-15 DIAGNOSIS — I10 BENIGN ESSENTIAL HTN: ICD-10-CM

## 2023-11-15 DIAGNOSIS — Z85.46 HISTORY OF PROSTATE CANCER: ICD-10-CM

## 2023-11-15 DIAGNOSIS — Z00.00 MEDICARE ANNUAL WELLNESS VISIT, SUBSEQUENT: Primary | ICD-10-CM

## 2023-11-15 PROCEDURE — G8484 FLU IMMUNIZE NO ADMIN: HCPCS | Performed by: INTERNAL MEDICINE

## 2023-11-15 PROCEDURE — 3075F SYST BP GE 130 - 139MM HG: CPT | Performed by: INTERNAL MEDICINE

## 2023-11-15 PROCEDURE — 1123F ACP DISCUSS/DSCN MKR DOCD: CPT | Performed by: INTERNAL MEDICINE

## 2023-11-15 PROCEDURE — 3079F DIAST BP 80-89 MM HG: CPT | Performed by: INTERNAL MEDICINE

## 2023-11-15 PROCEDURE — G0439 PPPS, SUBSEQ VISIT: HCPCS | Performed by: INTERNAL MEDICINE

## 2023-11-15 RX ORDER — GABAPENTIN 300 MG/1
300 CAPSULE ORAL 2 TIMES DAILY
Qty: 180 CAPSULE | Refills: 3
Start: 2023-11-15 | End: 2025-01-06

## 2023-11-15 NOTE — PROGRESS NOTES
Medicare Annual Wellness Visit    Mitali Stephen is here for Medicare AWV and Lab Collection (Fasting. )    Assessment & Plan   Medicare annual wellness visit, subsequent  Up-to-date with all preventative services. Benign essential HTN  Blood pressure is well controlled on lisinopril. Continue.  -     Lipid Panel; Future  -     Comprehensive Metabolic Panel; Future  -     CBC; Future  Screening for prostate cancer  -     PSA Screening; Future  History of prostate cancer  Past history of prostate cancer in 2002. Prostatectomy at that time. Very unlikely to recur. Check PSA per request.  Pulmonary fibrosis ([address])  Relatively stable, managed closely by pulmonary Dr. Rodrigo Arambula. Continue Ofev and gabapentin for cough. This is likely eventually a life limiting condition, but he is doing very well overall. Offered support.  -     gabapentin (NEURONTIN) 300 MG capsule; Take 1 capsule by mouth 2 times daily. For chronic cough Max Daily Amount: 600 mg, Disp-180 capsule, R-3NO PRINT    Recommendations for Preventive Services Due: see orders and patient instructions/AVS.  Recommended screening schedule for the next 5-10 years is provided to the patient in written form: see Patient Instructions/AVS.     No follow-ups on file. Subjective     He is doing well.  to [facility]. He is going to support her through a total knee replacement in January. He has 1 son who is 39 and he has 2 grandsons. Pulmonary fibrosis. He is followed closely by Dr. Rodrigo Arambula at [facility]. He continues Ofev therapy and does have some intermittent diarrhea. Cough is managed by gabapentin twice daily with reasonable results. Does have slowly increasing dyspnea on exertion, but overall fairly stable. Hypertension  Hypertension ROS: taking medications as instructed, no medication side effects noted, no TIA's, no chest pain on exertion, no dyspnea on exertion, no swelling of ankles     reports that he has never smoked.  He has never used smokeless

## 2023-11-16 LAB
ALBUMIN SERPL-MCNC: 3.8 G/DL (ref 3.5–5)
ALBUMIN/GLOB SERPL: 1.2 (ref 1.1–2.2)
ALP SERPL-CCNC: 72 U/L (ref 45–117)
ALT SERPL-CCNC: 32 U/L (ref 12–78)
ANION GAP SERPL CALC-SCNC: 7 MMOL/L (ref 5–15)
AST SERPL-CCNC: 38 U/L (ref 15–37)
BILIRUB SERPL-MCNC: 0.7 MG/DL (ref 0.2–1)
BUN SERPL-MCNC: 19 MG/DL (ref 6–20)
BUN/CREAT SERPL: 27 (ref 12–20)
CALCIUM SERPL-MCNC: 9.2 MG/DL (ref 8.5–10.1)
CHLORIDE SERPL-SCNC: 107 MMOL/L (ref 97–108)
CHOLEST SERPL-MCNC: 181 MG/DL
CO2 SERPL-SCNC: 25 MMOL/L (ref 21–32)
CREAT SERPL-MCNC: 0.71 MG/DL (ref 0.7–1.3)
ERYTHROCYTE [DISTWIDTH] IN BLOOD BY AUTOMATED COUNT: 14.7 % (ref 11.5–14.5)
GLOBULIN SER CALC-MCNC: 3.3 G/DL (ref 2–4)
GLUCOSE SERPL-MCNC: 100 MG/DL (ref 65–100)
HCT VFR BLD AUTO: 46.2 % (ref 36.6–50.3)
HDLC SERPL-MCNC: 61 MG/DL
HDLC SERPL: 3 (ref 0–5)
HGB BLD-MCNC: 14.3 G/DL (ref 12.1–17)
LDLC SERPL CALC-MCNC: 104.2 MG/DL (ref 0–100)
MCH RBC QN AUTO: 30 PG (ref 26–34)
MCHC RBC AUTO-ENTMCNC: 31 G/DL (ref 30–36.5)
MCV RBC AUTO: 97.1 FL (ref 80–99)
NRBC # BLD: 0 K/UL (ref 0–0.01)
NRBC BLD-RTO: 0 PER 100 WBC
PLATELET # BLD AUTO: 182 K/UL (ref 150–400)
PMV BLD AUTO: 12.9 FL (ref 8.9–12.9)
POTASSIUM SERPL-SCNC: 5.1 MMOL/L (ref 3.5–5.1)
PROT SERPL-MCNC: 7.1 G/DL (ref 6.4–8.2)
PSA SERPL-MCNC: 0 NG/ML (ref 0.01–4)
RBC # BLD AUTO: 4.76 M/UL (ref 4.1–5.7)
SODIUM SERPL-SCNC: 139 MMOL/L (ref 136–145)
TRIGL SERPL-MCNC: 79 MG/DL
VLDLC SERPL CALC-MCNC: 15.8 MG/DL
WBC # BLD AUTO: 6.5 K/UL (ref 4.1–11.1)

## 2024-05-15 ENCOUNTER — OFFICE VISIT (OUTPATIENT)
Age: 79
End: 2024-05-15
Payer: MEDICARE

## 2024-05-15 VITALS
BODY MASS INDEX: 28.28 KG/M2 | SYSTOLIC BLOOD PRESSURE: 124 MMHG | HEART RATE: 79 BPM | HEIGHT: 72 IN | DIASTOLIC BLOOD PRESSURE: 76 MMHG | TEMPERATURE: 97.7 F | OXYGEN SATURATION: 95 % | WEIGHT: 208.8 LBS | RESPIRATION RATE: 18 BRPM

## 2024-05-15 DIAGNOSIS — J84.10 PULMONARY FIBROSIS (HCC): ICD-10-CM

## 2024-05-15 DIAGNOSIS — R06.09 DYSPNEA ON EXERTION: ICD-10-CM

## 2024-05-15 DIAGNOSIS — I10 BENIGN ESSENTIAL HTN: ICD-10-CM

## 2024-05-15 DIAGNOSIS — I10 BENIGN ESSENTIAL HTN: Primary | ICD-10-CM

## 2024-05-15 PROCEDURE — G8417 CALC BMI ABV UP PARAM F/U: HCPCS | Performed by: INTERNAL MEDICINE

## 2024-05-15 PROCEDURE — 3074F SYST BP LT 130 MM HG: CPT | Performed by: INTERNAL MEDICINE

## 2024-05-15 PROCEDURE — 1036F TOBACCO NON-USER: CPT | Performed by: INTERNAL MEDICINE

## 2024-05-15 PROCEDURE — 99213 OFFICE O/P EST LOW 20 MIN: CPT | Performed by: INTERNAL MEDICINE

## 2024-05-15 PROCEDURE — G8427 DOCREV CUR MEDS BY ELIG CLIN: HCPCS | Performed by: INTERNAL MEDICINE

## 2024-05-15 PROCEDURE — 3078F DIAST BP <80 MM HG: CPT | Performed by: INTERNAL MEDICINE

## 2024-05-15 PROCEDURE — 1123F ACP DISCUSS/DSCN MKR DOCD: CPT | Performed by: INTERNAL MEDICINE

## 2024-05-15 RX ORDER — PIRFENIDONE 267 MG/1
801 CAPSULE ORAL 3 TIMES DAILY
COMMUNITY
Start: 2024-03-13

## 2024-05-15 ASSESSMENT — PATIENT HEALTH QUESTIONNAIRE - PHQ9
SUM OF ALL RESPONSES TO PHQ QUESTIONS 1-9: 0
SUM OF ALL RESPONSES TO PHQ QUESTIONS 1-9: 0
1. LITTLE INTEREST OR PLEASURE IN DOING THINGS: NOT AT ALL
SUM OF ALL RESPONSES TO PHQ QUESTIONS 1-9: 0
2. FEELING DOWN, DEPRESSED OR HOPELESS: NOT AT ALL
SUM OF ALL RESPONSES TO PHQ QUESTIONS 1-9: 0
SUM OF ALL RESPONSES TO PHQ9 QUESTIONS 1 & 2: 0

## 2024-05-15 NOTE — PROGRESS NOTES
\"Have you been to the ER, urgent care clinic since your last visit?  Hospitalized since your last visit?\"    NO    “Have you seen or consulted any other health care providers outside of [facility] since your last visit?”    NO          Click Here for Release of Records Request    
and Surgical History   has a past medical history of Basal cell carcinoma (BCC) of antitragus of right ear, Benign essential HTN, Cataract, Elevated hemidiaphragm, History of prostate cancer, History of rubella, Pulmonary fibrosis (HCC), Right hip pain, Rosacea, and Umbilical hernia.     has a past surgical history that includes Prostatectomy (5/2002); Cataract removal (Right, 2019); Cataract removal (Left, 11/29/2018); other surgical history; Total hip arthroplasty (Right, 02/12/2018); hernia repair (05/2012); Colonoscopy (07/19/2017); Colonoscopy (02/25/2005); Colonoscopy (02/09/2023); joint replacement; eye surgery (12/2018); and Umbilical hernia repair (03/2012).     reports that he has never smoked. He has never used smokeless tobacco. He reports current alcohol use of about 4.0 standard drinks of alcohol per week. He reports that he does not use drugs.    family history includes Breast Cancer (age of onset: 75) in his mother; Cancer (age of onset: 70) in his father; Heart Disease in his brother; Hypertension in his brother; Lymphoma in his son; Pulmonary Fibrosis in his brother.    Physical Exam   Nursing note and vitals reviewed.  Blood pressure 124/76, pulse 79, temperature 97.7 °F (36.5 °C), temperature source Oral, resp. rate 18, height 1.829 m (6'), weight 94.7 kg (208 lb 12.8 oz), SpO2 95 %.  Constitutional:  No distress.    Eyes: Conjunctivae are normal.   Ears:  Hearing grossly intact  Cardiovascular: Normal rate. regular rhythm, no murmurs or gallops  No edema  Pulmonary/Chest: Effort normal.   CTAB  Musculoskeletal: moves all 4 extremities   Neurological: Alert and oriented to person, place, and time.   Skin: No visible rash noted.   Psychiatric: Normal mood and affect. Behavior is normal.     Assessment and Plan    Chico was seen today for hypertension and lab collection.    Diagnoses and all orders for this visit:    Benign essential HTN  Pressure is well-controlled on lisinopril 20 mg daily.

## 2024-05-16 LAB
ALBUMIN SERPL-MCNC: 3.7 G/DL (ref 3.5–5)
ALBUMIN/GLOB SERPL: 1.2 (ref 1.1–2.2)
ALP SERPL-CCNC: 72 U/L (ref 45–117)
ALT SERPL-CCNC: 22 U/L (ref 12–78)
ANION GAP SERPL CALC-SCNC: 4 MMOL/L (ref 5–15)
AST SERPL-CCNC: 26 U/L (ref 15–37)
BILIRUB SERPL-MCNC: 0.3 MG/DL (ref 0.2–1)
BUN SERPL-MCNC: 18 MG/DL (ref 6–20)
BUN/CREAT SERPL: 24 (ref 12–20)
CALCIUM SERPL-MCNC: 9.5 MG/DL (ref 8.5–10.1)
CHLORIDE SERPL-SCNC: 106 MMOL/L (ref 97–108)
CO2 SERPL-SCNC: 27 MMOL/L (ref 21–32)
CREAT SERPL-MCNC: 0.76 MG/DL (ref 0.7–1.3)
ERYTHROCYTE [DISTWIDTH] IN BLOOD BY AUTOMATED COUNT: 14.7 % (ref 11.5–14.5)
GLOBULIN SER CALC-MCNC: 3.2 G/DL (ref 2–4)
GLUCOSE SERPL-MCNC: 92 MG/DL (ref 65–100)
HCT VFR BLD AUTO: 41.6 % (ref 36.6–50.3)
HGB BLD-MCNC: 13.2 G/DL (ref 12.1–17)
MCH RBC QN AUTO: 29.5 PG (ref 26–34)
MCHC RBC AUTO-ENTMCNC: 31.7 G/DL (ref 30–36.5)
MCV RBC AUTO: 93.1 FL (ref 80–99)
NRBC # BLD: 0 K/UL (ref 0–0.01)
NRBC BLD-RTO: 0 PER 100 WBC
PLATELET # BLD AUTO: 182 K/UL (ref 150–400)
PMV BLD AUTO: 12.1 FL (ref 8.9–12.9)
POTASSIUM SERPL-SCNC: 4.5 MMOL/L (ref 3.5–5.1)
PROT SERPL-MCNC: 6.9 G/DL (ref 6.4–8.2)
RBC # BLD AUTO: 4.47 M/UL (ref 4.1–5.7)
SODIUM SERPL-SCNC: 137 MMOL/L (ref 136–145)
WBC # BLD AUTO: 6.8 K/UL (ref 4.1–11.1)

## 2024-07-21 DIAGNOSIS — I10 BENIGN ESSENTIAL HTN: ICD-10-CM

## 2024-07-22 RX ORDER — LISINOPRIL 20 MG/1
20 TABLET ORAL DAILY
Qty: 90 TABLET | Refills: 3 | Status: SHIPPED | OUTPATIENT
Start: 2024-07-22 | End: 2024-07-24 | Stop reason: ALTCHOICE

## 2024-07-22 NOTE — TELEPHONE ENCOUNTER
PCP: Brad Page MD    Last appt: 5/15/2024   Future Appointments   Date Time Provider Department Center   7/24/2024  1:00 PM Brad Page MD [facility] BS AMB   11/27/2024  9:00 AM Brad Page MD [facility] AMB       Requested Prescriptions     Pending Prescriptions Disp Refills    lisinopril (PRINIVIL;ZESTRIL) 20 MG tablet [Pharmacy Med Name: LISINOPRIL 20 MG TABLET] 90 tablet 3     Sig: TAKE 1 TABLET BY MOUTH DAILY     Refill request sent by [employer] for [facility] on [facility] for lisinopril 20mg tab QD, 90 tabs with 3 refills. Rx in pending for provider review and approval.    Gretchen \"Galen\" ADDY Bunch

## 2024-07-24 ENCOUNTER — OFFICE VISIT (OUTPATIENT)
Age: 79
End: 2024-07-24
Payer: MEDICARE

## 2024-07-24 VITALS
SYSTOLIC BLOOD PRESSURE: 99 MMHG | HEART RATE: 80 BPM | HEIGHT: 72 IN | OXYGEN SATURATION: 92 % | RESPIRATION RATE: 18 BRPM | WEIGHT: 209 LBS | BODY MASS INDEX: 28.31 KG/M2 | DIASTOLIC BLOOD PRESSURE: 65 MMHG | TEMPERATURE: 97.5 F

## 2024-07-24 DIAGNOSIS — I95.9 HYPOTENSION, UNSPECIFIED HYPOTENSION TYPE: Primary | ICD-10-CM

## 2024-07-24 DIAGNOSIS — I10 BENIGN ESSENTIAL HTN: ICD-10-CM

## 2024-07-24 DIAGNOSIS — M21.372 FOOT DROP, LEFT: ICD-10-CM

## 2024-07-24 DIAGNOSIS — E27.49 SECONDARY ADRENAL INSUFFICIENCY (HCC): ICD-10-CM

## 2024-07-24 DIAGNOSIS — J84.10 PULMONARY FIBROSIS (HCC): ICD-10-CM

## 2024-07-24 PROCEDURE — G8417 CALC BMI ABV UP PARAM F/U: HCPCS | Performed by: INTERNAL MEDICINE

## 2024-07-24 PROCEDURE — 99213 OFFICE O/P EST LOW 20 MIN: CPT | Performed by: INTERNAL MEDICINE

## 2024-07-24 PROCEDURE — 1036F TOBACCO NON-USER: CPT | Performed by: INTERNAL MEDICINE

## 2024-07-24 PROCEDURE — G8427 DOCREV CUR MEDS BY ELIG CLIN: HCPCS | Performed by: INTERNAL MEDICINE

## 2024-07-24 PROCEDURE — 1123F ACP DISCUSS/DSCN MKR DOCD: CPT | Performed by: INTERNAL MEDICINE

## 2024-07-24 PROCEDURE — 3074F SYST BP LT 130 MM HG: CPT | Performed by: INTERNAL MEDICINE

## 2024-07-24 PROCEDURE — 3078F DIAST BP <80 MM HG: CPT | Performed by: INTERNAL MEDICINE

## 2024-07-24 RX ORDER — SULFAMETHOXAZOLE AND TRIMETHOPRIM 800; 160 MG/1; MG/1
2 TABLET ORAL
COMMUNITY
Start: 2024-06-21 | End: 2024-08-05

## 2024-07-24 RX ORDER — PREDNISONE 20 MG/1
TABLET ORAL
COMMUNITY
Start: 2024-06-19

## 2024-07-24 RX ORDER — GABAPENTIN 300 MG/1
300 CAPSULE ORAL 3 TIMES DAILY
Qty: 90 CAPSULE | Refills: 11
Start: 2024-07-24 | End: 2025-07-24

## 2024-07-24 NOTE — PROGRESS NOTES
Identified pt with two pt identifiers(name and ). Reviewed record in preparation for visit and have obtained necessary documentation. All patient medications has been reviewed.  Chief Complaint   Patient presents with    Follow-up    Hypertension       Vitals:    24 1248   BP: 99/65   Pulse: 80   Resp: 18   Temp: 97.5 °F (36.4 °C)   SpO2: 92%                   Coordination of Care Questionnaire:   1) Have you been to an emergency room, urgent care, or hospitalized since your last visit?   no    2. Have seen or consulted any other health care provider since your last visit? no

## 2024-07-24 NOTE — PROGRESS NOTES
HISTORY OF PRESENT ILLNESS    Chief Complaint   Patient presents with    Follow-up    Hypertension       Presents for follow-up    Reports hypotension  since 7/15/24  Blood pressure has been as low as 60s over 40s.  Variable though.  Stopped lisinopril 5-6 days ago.    Was recently tapered from high steroid 60 mg prednisone 15 days, 40 mg 15 days, now on 20 mg since 7/20 and weaning.    Due to consideration of adrenal insufficiency, considering hydrocortisone prn per Mary.    3/2024, was transitioned off of nintedanib due to continuing slow decline in forced vital capacity and started pirfenidone 267 mg tid.  She has a significant shortness of breath progression.  Pulse ox normal.  Blood pressure 99/65, pulse 80, temperature 97.5 °F (36.4 °C), temperature source Temporal, resp. rate 18, height 1.829 m (6'), weight 94.8 kg (209 lb), SpO2 92 %.    Noted L foot drop a few weeks ago.  Onset was after crossing L leg over right while sitting, then walking.  No other s/s of back pain, stroke.  Improving . Steps difficult, now improving.      Review of Systems   All other systems reviewed and are negative, except as noted in HPI    Past Medical and Surgical History   has a past medical history of Basal cell carcinoma (BCC) of antitragus of right ear, Benign essential HTN, Cataract, Elevated hemidiaphragm, History of prostate cancer, History of rubella, Pulmonary fibrosis (HCC), Right hip pain, Rosacea, and Umbilical hernia.     has a past surgical history that includes Prostatectomy (5/2002); Cataract removal (Right, 2019); Cataract removal (Left, 11/29/2018); other surgical history; Total hip arthroplasty (Right, 02/12/2018); hernia repair (05/2012); Colonoscopy (07/19/2017); Colonoscopy (02/25/2005); Colonoscopy (02/09/2023); joint replacement; eye surgery (12/2018); and Umbilical hernia repair (03/2012).     reports that he has never smoked. He has never used smokeless tobacco. He reports current alcohol use of about

## 2024-11-27 ENCOUNTER — OFFICE VISIT (OUTPATIENT)
Facility: CLINIC | Age: 79
End: 2024-11-27
Payer: MEDICARE

## 2024-11-27 VITALS
WEIGHT: 212 LBS | HEIGHT: 72 IN | DIASTOLIC BLOOD PRESSURE: 86 MMHG | SYSTOLIC BLOOD PRESSURE: 138 MMHG | BODY MASS INDEX: 28.71 KG/M2 | RESPIRATION RATE: 18 BRPM | TEMPERATURE: 97.8 F | OXYGEN SATURATION: 94 % | HEART RATE: 78 BPM

## 2024-11-27 DIAGNOSIS — I10 BENIGN ESSENTIAL HTN: ICD-10-CM

## 2024-11-27 DIAGNOSIS — Z00.00 MEDICARE ANNUAL WELLNESS VISIT, SUBSEQUENT: Primary | ICD-10-CM

## 2024-11-27 DIAGNOSIS — Z85.46 HISTORY OF PROSTATE CANCER: ICD-10-CM

## 2024-11-27 DIAGNOSIS — Z12.5 SCREENING FOR PROSTATE CANCER: ICD-10-CM

## 2024-11-27 DIAGNOSIS — J84.9 INTERSTITIAL LUNG DISEASE (HCC): ICD-10-CM

## 2024-11-27 LAB
ALBUMIN SERPL-MCNC: 3.8 G/DL (ref 3.5–5)
ALBUMIN/GLOB SERPL: 1.2 (ref 1.1–2.2)
ALP SERPL-CCNC: 86 U/L (ref 45–117)
ALT SERPL-CCNC: 26 U/L (ref 12–78)
ANION GAP SERPL CALC-SCNC: 5 MMOL/L (ref 2–12)
APPEARANCE UR: CLEAR
AST SERPL-CCNC: 30 U/L (ref 15–37)
BACTERIA URNS QL MICRO: NEGATIVE /HPF
BILIRUB SERPL-MCNC: 0.5 MG/DL (ref 0.2–1)
BILIRUB UR QL: NEGATIVE
BUN SERPL-MCNC: 15 MG/DL (ref 6–20)
BUN/CREAT SERPL: 20 (ref 12–20)
CALCIUM SERPL-MCNC: 9.7 MG/DL (ref 8.5–10.1)
CHLORIDE SERPL-SCNC: 106 MMOL/L (ref 97–108)
CHOLEST SERPL-MCNC: 202 MG/DL
CO2 SERPL-SCNC: 30 MMOL/L (ref 21–32)
COLOR UR: NORMAL
CREAT SERPL-MCNC: 0.74 MG/DL (ref 0.7–1.3)
EPITH CASTS URNS QL MICRO: NORMAL /LPF
ERYTHROCYTE [DISTWIDTH] IN BLOOD BY AUTOMATED COUNT: 14.1 % (ref 11.5–14.5)
GLOBULIN SER CALC-MCNC: 3.2 G/DL (ref 2–4)
GLUCOSE SERPL-MCNC: 115 MG/DL (ref 65–100)
GLUCOSE UR STRIP.AUTO-MCNC: NEGATIVE MG/DL
HCT VFR BLD AUTO: 44.5 % (ref 36.6–50.3)
HDLC SERPL-MCNC: 76 MG/DL
HDLC SERPL: 2.7 (ref 0–5)
HGB BLD-MCNC: 14.3 G/DL (ref 12.1–17)
HGB UR QL STRIP: NEGATIVE
HYALINE CASTS URNS QL MICRO: NORMAL /LPF (ref 0–5)
KETONES UR QL STRIP.AUTO: NEGATIVE MG/DL
LDLC SERPL CALC-MCNC: 110.6 MG/DL (ref 0–100)
LEUKOCYTE ESTERASE UR QL STRIP.AUTO: NEGATIVE
MCH RBC QN AUTO: 29.2 PG (ref 26–34)
MCHC RBC AUTO-ENTMCNC: 32.1 G/DL (ref 30–36.5)
MCV RBC AUTO: 90.8 FL (ref 80–99)
NITRITE UR QL STRIP.AUTO: NEGATIVE
NRBC # BLD: 0 K/UL (ref 0–0.01)
NRBC BLD-RTO: 0 PER 100 WBC
PH UR STRIP: 5 (ref 5–8)
PLATELET # BLD AUTO: 181 K/UL (ref 150–400)
PMV BLD AUTO: 12.3 FL (ref 8.9–12.9)
POTASSIUM SERPL-SCNC: 4.5 MMOL/L (ref 3.5–5.1)
PROT SERPL-MCNC: 7 G/DL (ref 6.4–8.2)
PROT UR STRIP-MCNC: NEGATIVE MG/DL
PSA SERPL-MCNC: 0 NG/ML (ref 0.01–4)
RBC # BLD AUTO: 4.9 M/UL (ref 4.1–5.7)
RBC #/AREA URNS HPF: NORMAL /HPF (ref 0–5)
SODIUM SERPL-SCNC: 141 MMOL/L (ref 136–145)
SP GR UR REFRACTOMETRY: 1.02 (ref 1–1.03)
TRIGL SERPL-MCNC: 77 MG/DL
UROBILINOGEN UR QL STRIP.AUTO: 0.2 EU/DL (ref 0.2–1)
VLDLC SERPL CALC-MCNC: 15.4 MG/DL
WBC # BLD AUTO: 5.7 K/UL (ref 4.1–11.1)
WBC URNS QL MICRO: NORMAL /HPF (ref 0–4)

## 2024-11-27 PROCEDURE — 1123F ACP DISCUSS/DSCN MKR DOCD: CPT | Performed by: INTERNAL MEDICINE

## 2024-11-27 PROCEDURE — 1160F RVW MEDS BY RX/DR IN RCRD: CPT | Performed by: INTERNAL MEDICINE

## 2024-11-27 PROCEDURE — G8484 FLU IMMUNIZE NO ADMIN: HCPCS | Performed by: INTERNAL MEDICINE

## 2024-11-27 PROCEDURE — 1159F MED LIST DOCD IN RCRD: CPT | Performed by: INTERNAL MEDICINE

## 2024-11-27 PROCEDURE — 1126F AMNT PAIN NOTED NONE PRSNT: CPT | Performed by: INTERNAL MEDICINE

## 2024-11-27 PROCEDURE — G0439 PPPS, SUBSEQ VISIT: HCPCS | Performed by: INTERNAL MEDICINE

## 2024-11-27 PROCEDURE — 3075F SYST BP GE 130 - 139MM HG: CPT | Performed by: INTERNAL MEDICINE

## 2024-11-27 PROCEDURE — 3079F DIAST BP 80-89 MM HG: CPT | Performed by: INTERNAL MEDICINE

## 2024-11-27 SDOH — ECONOMIC STABILITY: FOOD INSECURITY: WITHIN THE PAST 12 MONTHS, YOU WORRIED THAT YOUR FOOD WOULD RUN OUT BEFORE YOU GOT MONEY TO BUY MORE.: NEVER TRUE

## 2024-11-27 SDOH — ECONOMIC STABILITY: INCOME INSECURITY: HOW HARD IS IT FOR YOU TO PAY FOR THE VERY BASICS LIKE FOOD, HOUSING, MEDICAL CARE, AND HEATING?: NOT HARD AT ALL

## 2024-11-27 SDOH — ECONOMIC STABILITY: FOOD INSECURITY: WITHIN THE PAST 12 MONTHS, THE FOOD YOU BOUGHT JUST DIDN'T LAST AND YOU DIDN'T HAVE MONEY TO GET MORE.: NEVER TRUE

## 2024-11-27 ASSESSMENT — PATIENT HEALTH QUESTIONNAIRE - PHQ9
2. FEELING DOWN, DEPRESSED OR HOPELESS: NOT AT ALL
SUM OF ALL RESPONSES TO PHQ9 QUESTIONS 1 & 2: 0
SUM OF ALL RESPONSES TO PHQ QUESTIONS 1-9: 0
SUM OF ALL RESPONSES TO PHQ QUESTIONS 1-9: 0
1. LITTLE INTEREST OR PLEASURE IN DOING THINGS: NOT AT ALL
SUM OF ALL RESPONSES TO PHQ QUESTIONS 1-9: 0
SUM OF ALL RESPONSES TO PHQ QUESTIONS 1-9: 0

## 2024-11-27 ASSESSMENT — LIFESTYLE VARIABLES
HOW OFTEN DO YOU HAVE A DRINK CONTAINING ALCOHOL: MONTHLY OR LESS
HOW MANY STANDARD DRINKS CONTAINING ALCOHOL DO YOU HAVE ON A TYPICAL DAY: 1 OR 2

## 2024-11-27 NOTE — PROGRESS NOTES
Medicare Annual Wellness Visit    Chico Chen is here for Medicare AWV and Lab Collection (Fasting. )    Assessment & Plan   Medicare annual wellness visit, subsequent  Interstitial lung disease (HCC)  This condition appears to be stable and is being evaluated and managed by his specialist Dr Hernandez.   No acute findings today warrant change in management plan.    Benign essential HTN  Off med for now.  following  -     Lipid Panel; Future  -     Comprehensive Metabolic Panel; Future  -     CBC; Future  -     Urinalysis with Microscopic; Future  History of prostate cancer 2002  -     Urinalysis with Microscopic; Future  Screening for prostate cancer  -     PSA Screening; Future  Recommendations for Preventive Services Due: see orders and patient instructions/AVS.  Recommended screening schedule for the next 5-10 years is provided to the patient in written form: see Patient Instructions/AVS.     No follow-ups on file.     Subjective   The following acute and/or chronic problems were also addressed today:    Interstitial lung dz.  Seeing Dr. Hernandez.  Taking pirfenidone.  Wearing oxygen with exertion, driving, and overnight now, not otherwise using at rest.    Cough is moderate.    PFTs 9//11/24  Spirometry reveals a mild reduction in the forced vital capacity.  Lung   volumes reveal moderate restrictive lung disease.  The diffusion capacity   is moderately reduced, which is most likely the result of loss of alveolar   capillary structure with loss of lung volume seen in interstitial lung   disease.  The flow-volume loop is consistent with restrictive lung   disease.   CT 3/29/24  2.  Chronic interstitial fibrotic disease process with pattern, morphologic features and distribution as outlined above with slight interval progression. No superimposed acute groundglass or consolidation. ATS HRCT descriptor: Probable UIP   3.  New indeterminate 5 mm nodule superior segment right lower lobe. Follow-up CT advised over the

## 2024-11-27 NOTE — PATIENT INSTRUCTIONS
screen for glaucoma; cataracts, macular degeneration, and other eye disorders.  A preventive dental visit is recommended every 6 months.  Try to get at least 150 minutes of exercise per week or 10,000 steps per day on a pedometer .  Order or download the FREE \"Exercise & Physical Activity: Your Everyday Guide\" from [employer]. Call 1-144.184.9270 or search [employer] online.  You need 9077-1592 mg of calcium and 9781-0460 IU of vitamin D per day. It is possible to meet your calcium requirement with diet alone, but a vitamin D supplement is usually necessary to meet this goal.  When exposed to the sun, use a sunscreen that protects against both UVA and UVB radiation with an SPF of 30 or greater. Reapply every 2 to 3 hours or after sweating, drying off with a towel, or swimming.  Always wear a seat belt when traveling in a car. Always wear a helmet when riding a bicycle or motorcycle.

## 2025-05-27 ENCOUNTER — OFFICE VISIT (OUTPATIENT)
Facility: CLINIC | Age: 80
End: 2025-05-27
Payer: MEDICARE

## 2025-05-27 VITALS
OXYGEN SATURATION: 98 % | WEIGHT: 206 LBS | TEMPERATURE: 98.2 F | RESPIRATION RATE: 16 BRPM | SYSTOLIC BLOOD PRESSURE: 120 MMHG | HEIGHT: 72 IN | HEART RATE: 83 BPM | DIASTOLIC BLOOD PRESSURE: 76 MMHG | BODY MASS INDEX: 27.9 KG/M2

## 2025-05-27 DIAGNOSIS — I10 BENIGN ESSENTIAL HTN: ICD-10-CM

## 2025-05-27 DIAGNOSIS — J84.9 INTERSTITIAL LUNG DISEASE (HCC): Primary | ICD-10-CM

## 2025-05-27 DIAGNOSIS — R63.0 DECREASED APPETITE: ICD-10-CM

## 2025-05-27 PROCEDURE — 3078F DIAST BP <80 MM HG: CPT | Performed by: INTERNAL MEDICINE

## 2025-05-27 PROCEDURE — 1160F RVW MEDS BY RX/DR IN RCRD: CPT | Performed by: INTERNAL MEDICINE

## 2025-05-27 PROCEDURE — 1036F TOBACCO NON-USER: CPT | Performed by: INTERNAL MEDICINE

## 2025-05-27 PROCEDURE — G8427 DOCREV CUR MEDS BY ELIG CLIN: HCPCS | Performed by: INTERNAL MEDICINE

## 2025-05-27 PROCEDURE — 99213 OFFICE O/P EST LOW 20 MIN: CPT | Performed by: INTERNAL MEDICINE

## 2025-05-27 PROCEDURE — 1123F ACP DISCUSS/DSCN MKR DOCD: CPT | Performed by: INTERNAL MEDICINE

## 2025-05-27 PROCEDURE — G8417 CALC BMI ABV UP PARAM F/U: HCPCS | Performed by: INTERNAL MEDICINE

## 2025-05-27 PROCEDURE — 3074F SYST BP LT 130 MM HG: CPT | Performed by: INTERNAL MEDICINE

## 2025-05-27 PROCEDURE — 1159F MED LIST DOCD IN RCRD: CPT | Performed by: INTERNAL MEDICINE

## 2025-05-27 RX ORDER — MEGESTROL ACETATE 40 MG/1
40 TABLET ORAL 2 TIMES DAILY
COMMUNITY
Start: 2025-03-12 | End: 2026-03-12

## 2025-05-27 RX ORDER — DIPHENHYDRAMINE HCL 25 MG
25 CAPSULE ORAL DAILY
COMMUNITY

## 2025-05-27 ASSESSMENT — PATIENT HEALTH QUESTIONNAIRE - PHQ9
SUM OF ALL RESPONSES TO PHQ QUESTIONS 1-9: 0
1. LITTLE INTEREST OR PLEASURE IN DOING THINGS: NOT AT ALL
2. FEELING DOWN, DEPRESSED OR HOPELESS: NOT AT ALL
SUM OF ALL RESPONSES TO PHQ QUESTIONS 1-9: 0

## 2025-05-27 NOTE — PROGRESS NOTES
HISTORY OF PRESENT ILLNESS    Chief Complaint   Patient presents with    Follow-up     Patient is not fasting          History of Present Illness  The patient is an 80-year-old male who presents for follow-up of  Idiopathic pulmonary fibrosis.  He had a consultation with Dr. Hernandez, his pulmonologist, on 03/12/2025. His condition remains stable, with no exacerbations of lung symptoms. He utilizes oxygen therapy during sleep and has undergone a home sleep study. He also uses oxygen during exertion, which he finds beneficial. His current medication regimen includes pirfenidone, taken three times daily. He reports that his physical activities are limited due to his condition, necessitating rest periods after exertion.     He has been prescribed megestrol 50 mg twice daily as an appetite stimulant, which he reports has improved his appetite. However, he notes a change in his sense of taste, describing food as bland. He has experienced weight fluctuations but currently maintains a balanced diet.    He takes Benadryl to help him sleep better.    He reports an improvement in his back pain and muscle spasms.    History of hypertension.  No current medication.    Results  Labs   - PSA: 11/2024, Zero   - Cholesterol: 11/2024, Reasonable   - Metabolic panel: 11/2024, Normal   - Urinalysis: 11/2024, Normal   - CBC: 11/2024, Normal    Diagnostic Testing   - Pulmonary function test: 03/12/2025, Moderate restrictive ventilatory delay with a 60 mL decrease    Review of Systems   All other systems reviewed and are negative, except as noted in HPI    Current Outpatient Medications   Medication Sig    Multiple Vitamin (MULTI VITAMIN MENS PO) Take 1 tablet by mouth daily    diphenhydrAMINE (BENADRYL) 25 MG capsule Take 1 capsule by mouth daily    megestrol (MEGACE) 40 MG tablet Take 1 tablet by mouth 2 times daily    OXYGEN Inhale 2 L into the lungs nightly    Pirfenidone 267 MG CAPS Take 801 mg by mouth 3 times daily    aspirin 325 MG